# Patient Record
Sex: FEMALE | Race: WHITE | NOT HISPANIC OR LATINO | Employment: PART TIME
[De-identification: names, ages, dates, MRNs, and addresses within clinical notes are randomized per-mention and may not be internally consistent; named-entity substitution may affect disease eponyms.]

---
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
## 2024-08-12 ENCOUNTER — TRANSFERRED RECORDS (OUTPATIENT)
Dept: HEALTH INFORMATION MANAGEMENT | Facility: CLINIC | Age: 24
End: 2024-08-12

## 2024-08-13 LAB
ABO (EXTERNAL): NORMAL
GROUP B STREPTOCOCCUS (EXTERNAL): NEGATIVE
HEMOGLOBIN (EXTERNAL): 14.5 G/DL (ref 11.7–15.5)
HEPATITIS B SURFACE ANTIGEN (EXTERNAL): NONREACTIVE
HIV1+2 AB SERPL QL IA: NONREACTIVE
PLATELET COUNT (EXTERNAL): 294 10E3/UL (ref 140–400)
RH (EXTERNAL): POSITIVE
RUBELLA ANTIBODY IGG (EXTERNAL): NORMAL
TREPONEMA PALLIDUM ANTIBODY (EXTERNAL): NONREACTIVE

## 2024-09-16 ENCOUNTER — TRANSFERRED RECORDS (OUTPATIENT)
Dept: HEALTH INFORMATION MANAGEMENT | Facility: CLINIC | Age: 24
End: 2024-09-16

## 2024-09-16 ENCOUNTER — MEDICAL CORRESPONDENCE (OUTPATIENT)
Dept: HEALTH INFORMATION MANAGEMENT | Facility: CLINIC | Age: 24
End: 2024-09-16

## 2024-09-17 ENCOUNTER — TRANSFERRED RECORDS (OUTPATIENT)
Dept: HEALTH INFORMATION MANAGEMENT | Facility: CLINIC | Age: 24
End: 2024-09-17

## 2024-09-23 ENCOUNTER — TRANSCRIBE ORDERS (OUTPATIENT)
Dept: MATERNAL FETAL MEDICINE | Facility: HOSPITAL | Age: 24
End: 2024-09-23
Payer: COMMERCIAL

## 2024-09-23 DIAGNOSIS — O26.90 PREGNANCY RELATED CONDITION, ANTEPARTUM: Primary | ICD-10-CM

## 2024-10-16 ENCOUNTER — PRE VISIT (OUTPATIENT)
Dept: MATERNAL FETAL MEDICINE | Facility: HOSPITAL | Age: 24
End: 2024-10-16
Payer: COMMERCIAL

## 2024-10-21 ENCOUNTER — OFFICE VISIT (OUTPATIENT)
Dept: MATERNAL FETAL MEDICINE | Facility: HOSPITAL | Age: 24
End: 2024-10-21
Attending: OBSTETRICS & GYNECOLOGY
Payer: COMMERCIAL

## 2024-10-21 ENCOUNTER — ANCILLARY PROCEDURE (OUTPATIENT)
Dept: ULTRASOUND IMAGING | Facility: HOSPITAL | Age: 24
End: 2024-10-21
Attending: OBSTETRICS & GYNECOLOGY
Payer: COMMERCIAL

## 2024-10-21 DIAGNOSIS — O26.90 PREGNANCY RELATED CONDITION, ANTEPARTUM: ICD-10-CM

## 2024-10-21 DIAGNOSIS — O99.210 OBESITY DURING PREGNANCY: ICD-10-CM

## 2024-10-21 DIAGNOSIS — Z31.5 ENCOUNTER FOR PROCREATIVE GENETIC COUNSELING: Primary | ICD-10-CM

## 2024-10-21 DIAGNOSIS — O26.832 RENAL DISEASE DURING PREGNANCY IN SECOND TRIMESTER: ICD-10-CM

## 2024-10-21 DIAGNOSIS — O99.321 DRUG USE AFFECTING PREGNANCY IN FIRST TRIMESTER: Primary | ICD-10-CM

## 2024-10-21 DIAGNOSIS — O28.3 ABNORMAL FETAL ULTRASOUND: ICD-10-CM

## 2024-10-21 PROCEDURE — 76811 OB US DETAILED SNGL FETUS: CPT

## 2024-10-21 PROCEDURE — 76811 OB US DETAILED SNGL FETUS: CPT | Mod: 26 | Performed by: OBSTETRICS & GYNECOLOGY

## 2024-10-21 PROCEDURE — 99213 OFFICE O/P EST LOW 20 MIN: CPT | Mod: 25 | Performed by: OBSTETRICS & GYNECOLOGY

## 2024-10-21 PROCEDURE — 96040 HC GENETIC COUNSELING, EACH 30 MINUTES: CPT

## 2024-10-21 PROCEDURE — G0463 HOSPITAL OUTPT CLINIC VISIT: HCPCS | Performed by: OBSTETRICS & GYNECOLOGY

## 2024-10-21 NOTE — NURSING NOTE
Marcia Herrera is a  at 18w5d who presents to [facility] for scheduled genetic counseling and L2.  SBAR given to Dr. Vasquez, see note in Epic.

## 2024-10-22 NOTE — PROGRESS NOTES
"Please see \"Imaging\" tab under \"Chart Review\" for details of today's visit.    Bret Vasquez    "

## 2024-10-22 NOTE — PROGRESS NOTES
[facility]  Genetic Counseling Consult    Patient:  Marcia Herrera  Preferred Name: Marcia YOB: 2000   Date of Service:  10/21/24   MRN: [id number]    Marcia was seen at the [facility] [place] for genetic consultation. The indication for genetic counseling is abnormal fetal ultrasound (identifying a maternal pelvic kidney). The patient was unaccompanied to this visit.     The session was conducted in English.      IMPRESSION/ PLAN   1. Marcia has not had genetic screening in this pregnancy and declines options discussed today.    2. During today's [facility] visit, Marcia had a genetic counseling session only. Screening and diagnostic testing was discussed and declined.     3. Since the patient chose aneuploidy screening via NIPT, quad screen is NOT recommended in the second trimester. If the patient desires screening for open neural tube defects, maternal serum AFP only is recommended, ideally between 16-18 weeks gestation.    4. The patient was not certain about whether to pursue carrier screening today. They will contact us if they would like to pursue screening.     5. Marcia had a level II comprehensive anatomy ultrasound today. Please see the ultrasound report for further details.    6. Further recommendation include a follow-up ultrasound with [facility]. The upcoming ultrasound has been scheduled for 2024.    PREGNANCY HISTORY   /Parity:       Marcia's pregnancy history is significant for:   AB ()    CURRENT PREGNANCY   Current Age: 24 year old   Age at Delivery: 25 year old  KELLEY: 3/19/2025, by Last Menstrual Period                                   Gestational Age: 18w5d  This pregnancy is a single gestation.     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 Per the EMR, she also has a history of poly substance use. For a complete review of  "her diagnoses and medical history, please refer to the medical record.    Embryonic kidney development begins in the 6th-8th weeks. Pelvic kidney occurs when a kidney fails to ascend to the lumbar region and therefore remains in the pelvis. Chromosome abnormalities are rare with isolated pelvic kidney. The finding can be seen in certain syndromes and associations such as CHARGE and VACTERL. In Marcia's case, pelvic kidney appears to be isolated based on her reported history. We discussed that there could possibly be a recurrence risk (such as if there were multifactorial inheritance factors).     FAMILY HISTORY   A three-generation pedigree was obtained today and is scanned under the \"Media\" tab in Epic. The family history was reported by Marcia.    The following significant findings were reported today for Marcia's family:   Limited health history for mother and father  Mother had 3 SABs and a stillbirth  Maternal half siblings have mental health concerns  Maternal half brother has fetal alcohol syndrome and autism  2 paternal aunts had 2 SABs each    Marcia reports that the father of her pregnancy, Saurav, is 31 years of age with no major reported health concerns. The following significant findings were reported today for his family:   Father has stage 4 lung cancer, diagnosed in his 50s  Mother had breast cancer 3 times (diagnosed in 30s-40s) and has had 2 SABs    Otherwise, the reported family history is unremarkable for other multiple miscarriages, other stillbirths, infant deaths, other birth defects, intellectual disabilities, developmental delays, other autism diagnoses, known genetic conditions, other cancer under the age of 50, sudden unexplained death under age 50, and consanguinity.     Recurrent Pregnancy Loss  Recurrent miscarriage is defined as three or more clinically recognized consecutive or non-consecutive pregnancy losses occurring prior to fetal viability (<24 weeks). We discussed that there " are multiple potential causes for recurrent pregnancy loss, including genetic factors. For example, familial chromosome rearrangements can increase the risk for recurrent loss.    IUFD, Stillbirth, and Infant Death  We discussed that stillbirth may sometimes be associated with chromosomal abnormalities or other genetic conditions.    Autism  Some forms of autism spectrum disorders can be associated with specific genetic conditions, where approximately 15-20% of individuals who have autism will have an identifiable genetic cause for this history.  However, most often these conditions are due to the combination of genes and environmental factors that can affect development.  Since there is a genetic component to autism spectrum disorders, the recurrence risk for other family members is higher among first-degree relatives of the individual with an autism spectrum disorder (full siblings), and decreases with distance in relationship to other second-degree relatives.     Cancer  We discussed how most cancer seen in families occurs sporadically, but about 5-10% may be due to an underlying genetic etiology. We discussed that cancer diagnosed under the age of 50 raises suspicion for a potential hereditary cancer syndrome. Other characteristics that may suggest a hereditary cancer syndrome include cancer in 2 or more close relatives on the same side of the family, multiple primary tumors, bilateral or multiple rare cancers, constellations of tumors associated with a specific cancer syndrome, and evidence of autosomal dominant transmission.     Nacho's mother's history is concerning for a possible hereditary cancer predisposition syndrome. Saurav is encouraged to share cancer family history with his health care providers. Even if no hereditary cancer syndrome is identified in a family, individuals may be eligibile for increased screening or risk management options given a family history.     Patients may also consider meeting  "with a cancer genetic counselor.  If a family wants more information, they can contact the [facility] Cancer Risk Management Program ([phone]). Physicians can also make referrals at [email or web address] or, if within the [facility] system, through Epic referral for \"Cancer Risk Mgmt/Cancer Genetic Counseling\"     RISK ASSESSMENT FOR INHERITED CONDITIONS AND CARRIER SCREENING OPTIONS   Expanded carrier screening is available to screen for autosomal recessive conditions and X-linked conditions in a large list of genes. Carrier screening does not test the pregnancy but gives a risk assessment for the pregnancy and future pregnancies to have the condition. Expanded carrier screening is designed to identify carrier status for conditions that are primarily childhood or adolescent onset. Expanded carrier screening does not evaluate for adult-onset conditions such as hereditary cancer syndromes, dementia/Alzheimer's disease, or cardiovascular disease risk factors. Additionally, expanded carrier screening is not comprehensive for all known genetic diseases or inherited conditions. Carrier screening does not test for all genetic and health conditions or risk factors.     Autosomal recessive conditions happen when a mutation has been inherited from the egg and sperm and include conditions like cystic fibrosis, thalassemia, hearing loss, spinal muscular atrophy, and more. We reviewed that when both biological parents carry a harmful genetic change in a gene associated with autosomal recessive inheritance, each of their pregnancies has a 1 in 4 (25%) chance to be affected by that condition. X-linked conditions happen when a mutation has been inherited from the egg and include conditions like fragile X syndrome.With X-linked conditions, the specific risk generally depends on the chromosomal sex of the fetus, with XY individuals (generally male) being most severely " affected.      screening  is also performed following delivery. About [place]  Screening    The patient does NOT have a family history of known inherited conditions. This does NOT mean the patient and/or their partner is not a carrier of a condition. Approximately 90% of couples at an increased reproductive risk for an inherited condition have no family history of that condition.     The patient has not had carrier screening previously.     The patient was not certain about whether to pursue carrier screening today. They will contact us if they would like to pursue screening.     RISK ASSESSMENT FOR CHROMOSOME CONDITIONS   We explained that the risk for fetal chromosome abnormalities increases with maternal age. We discussed specific features of common chromosome abnormalities, including trisomy 21 (Down syndrome), trisomy 13, trisomy 18, and sex chromosome trisomies.    At age 25 at midtrimester, the risk to have a baby with Down syndrome is 1 in 1114.  At age 25 at midtrimester, the risk to have a baby with any chromosome abnormality is 1 in 557.     Marcia had genetic screening earlier in this pregnancy. Their non-invasive prenatal test was screen negative or low risk for screened conditions     Non-invasive prenatal testing (NIPT) results  Maternal plasma cell-free DNA testing  Screens for fetal trisomy 21, trisomy 13, trisomy 18, and sex chromosome aneuploidy  Marcia had a Qnatal test earlier in pregnancy; we reviewed the results today, which are low risk.  No Y chromosome was detected. The predicted sex is XX, which is typically female.  Qnatal panels may also include select microdeletion screening.   Given the accuracy of this test, these results greatly decrease the chance for certain fetal chromosome abnormalities  We discussed the limitations of normal NIPT results  Maternal serum AFP only to screen for open neural tube defects (after 15 weeks) was not performed in this pregnancy, to our  knowledge.     GENETIC TESTING OPTIONS   Genetic testing during a pregnancy includes screening and diagnostic procedures.      Screening tests are non-invasive which means no risk to the pregnancy and includes ultrasounds and blood work. The benefits and limitations of screening were reviewed. Screening tests provide a risk assessment (chance) specific to the pregnancy for certain fetal chromosome abnormalities but cannot definitively diagnose or exclude a fetal chromosome abnormality. Follow-up genetic counseling and consideration of diagnostic testing is recommended with any abnormal screening result. Diagnostic testing during a pregnancy is more certain and can test for more conditions. However, the tests do have a risk of miscarriage that requires careful consideration. These tests can detect fetal chromosome abnormalities with greater than 99% certainty. Results can be compromised by maternal cell contamination or mosaicism and are limited by the resolution of current genetic testing technology.     There is no screening or diagnostic test that detects all forms of birth defects or intellectual disability.     We discussed the following screening options:   Non-invasive prenatal testing (NIPT)  Please see details discussed above.     We discussed the following ultrasound options:  Comprehensive level II ultrasound (Fetal Anatomy Ultrasound)  Ultrasound done between 18-20 weeks gestation  Screens for major birth defects and markers for aneuploidy (like trisomy 21 and trisomy 18)  Includes looking at the fetus/baby's growth, heart, organs (stomach, kidneys), placenta, and amniotic fluid    We discussed the following diagnostic options:   Amniocentesis  Invasive diagnostic procedure done after 15 weeks gestation  The procedure collects a small sample of amniotic fluid for the purpose of chromosomal testing and/or other genetic testing  Diagnostic result; more than 99% sensitivity for fetal chromosome  abnormalities  Testing for AFP in the amniotic fluid can test for open neural tube defects    It was a pleasure to be involved with Marcia s care. Face-to-face time of the meeting was  25  minutes.    Mark Middleton MS, [facility]  Board Certified and [place] Licensed Genetic Counselor  [facility]  Maternal Fetal Medicine  Office: [phone]  [facility]: [phone]   Fax: [phone]  [facility]

## 2024-11-13 ENCOUNTER — OFFICE VISIT (OUTPATIENT)
Dept: MATERNAL FETAL MEDICINE | Facility: HOSPITAL | Age: 24
End: 2024-11-13
Attending: OBSTETRICS & GYNECOLOGY
Payer: COMMERCIAL

## 2024-11-13 ENCOUNTER — ANCILLARY PROCEDURE (OUTPATIENT)
Dept: ULTRASOUND IMAGING | Facility: HOSPITAL | Age: 24
End: 2024-11-13
Attending: OBSTETRICS & GYNECOLOGY
Payer: COMMERCIAL

## 2024-11-13 DIAGNOSIS — E66.09 CLASS 2 OBESITY DUE TO EXCESS CALORIES WITHOUT SERIOUS COMORBIDITY IN ADULT, UNSPECIFIED BMI: Primary | ICD-10-CM

## 2024-11-13 DIAGNOSIS — E66.812 CLASS 2 OBESITY DUE TO EXCESS CALORIES WITHOUT SERIOUS COMORBIDITY IN ADULT, UNSPECIFIED BMI: Primary | ICD-10-CM

## 2024-11-13 DIAGNOSIS — Z36.2 ENCOUNTER FOR FOLLOW-UP ULTRASOUND OF FETAL ANATOMY: ICD-10-CM

## 2024-11-13 DIAGNOSIS — O99.321 DRUG USE AFFECTING PREGNANCY IN FIRST TRIMESTER: ICD-10-CM

## 2024-11-13 PROCEDURE — 76816 OB US FOLLOW-UP PER FETUS: CPT | Mod: 26 | Performed by: OBSTETRICS & GYNECOLOGY

## 2024-11-13 PROCEDURE — 76816 OB US FOLLOW-UP PER FETUS: CPT

## 2024-11-13 PROCEDURE — 99207 PR NO CHARGE LOS: CPT | Performed by: OBSTETRICS & GYNECOLOGY

## 2024-11-13 NOTE — PROGRESS NOTES
Please refer to ultrasound report under 'Imaging' Studies of 'Chart Review' tabs.    Jason Sandy M.D.

## 2024-11-13 NOTE — NURSING NOTE
Marcia Herrera is a  at 22w0d who presents to [facility] for follow up growth ultrasound. Pt reports positive fetal movement. Pt denies bldg/lof/change in discharge, contractions, headache, vision changes, chest pain/SOB or edema. SBAR given to Dr. Sandy, see note in Epic.

## 2025-01-23 ENCOUNTER — MEDICAL CORRESPONDENCE (OUTPATIENT)
Dept: HEALTH INFORMATION MANAGEMENT | Facility: CLINIC | Age: 25
End: 2025-01-23
Payer: COMMERCIAL

## 2025-01-23 ENCOUNTER — TRANSCRIBE ORDERS (OUTPATIENT)
Dept: MATERNAL FETAL MEDICINE | Facility: HOSPITAL | Age: 25
End: 2025-01-23
Payer: COMMERCIAL

## 2025-01-23 DIAGNOSIS — O26.90 PREGNANCY RELATED CONDITION, ANTEPARTUM: Primary | ICD-10-CM

## 2025-01-24 ENCOUNTER — ANCILLARY PROCEDURE (OUTPATIENT)
Dept: ULTRASOUND IMAGING | Facility: HOSPITAL | Age: 25
End: 2025-01-24
Attending: OBSTETRICS & GYNECOLOGY
Payer: COMMERCIAL

## 2025-01-24 DIAGNOSIS — O26.90 PREGNANCY RELATED CONDITION, ANTEPARTUM: ICD-10-CM

## 2025-01-24 PROCEDURE — 76816 OB US FOLLOW-UP PER FETUS: CPT | Mod: 26 | Performed by: OBSTETRICS & GYNECOLOGY

## 2025-01-24 PROCEDURE — 76816 OB US FOLLOW-UP PER FETUS: CPT

## 2025-01-24 PROCEDURE — 76820 UMBILICAL ARTERY ECHO: CPT | Mod: 26 | Performed by: OBSTETRICS & GYNECOLOGY

## 2025-01-24 PROCEDURE — 59025 FETAL NON-STRESS TEST: CPT | Mod: 26 | Performed by: OBSTETRICS & GYNECOLOGY

## 2025-01-24 PROCEDURE — 99214 OFFICE O/P EST MOD 30 MIN: CPT | Mod: 25 | Performed by: OBSTETRICS & GYNECOLOGY

## 2025-01-24 PROCEDURE — 76820 UMBILICAL ARTERY ECHO: CPT

## 2025-01-31 ENCOUNTER — HOSPITAL ENCOUNTER (OUTPATIENT)
Dept: ULTRASOUND IMAGING | Facility: CLINIC | Age: 25
Discharge: HOME | End: 2025-01-31
Attending: STUDENT IN AN ORGANIZED HEALTH CARE EDUCATION/TRAINING PROGRAM
Payer: COMMERCIAL

## 2025-01-31 DIAGNOSIS — O36.5990 FETAL GROWTH RESTRICTION ANTEPARTUM: ICD-10-CM

## 2025-01-31 PROCEDURE — 76820 UMBILICAL ARTERY ECHO: CPT

## 2025-02-07 ENCOUNTER — HOSPITAL ENCOUNTER (OUTPATIENT)
Dept: ULTRASOUND IMAGING | Facility: CLINIC | Age: 25
Discharge: HOME | End: 2025-02-07
Attending: OBSTETRICS & GYNECOLOGY
Payer: COMMERCIAL

## 2025-02-07 DIAGNOSIS — O36.5990 FETAL GROWTH RESTRICTION ANTEPARTUM: ICD-10-CM

## 2025-02-07 PROCEDURE — 76820 UMBILICAL ARTERY ECHO: CPT

## 2025-02-17 ENCOUNTER — HOSPITAL ENCOUNTER (OUTPATIENT)
Dept: ULTRASOUND IMAGING | Facility: CLINIC | Age: 25
Discharge: HOME | End: 2025-02-17
Attending: OBSTETRICS & GYNECOLOGY
Payer: COMMERCIAL

## 2025-02-17 ENCOUNTER — OFFICE VISIT (OUTPATIENT)
Dept: MATERNAL FETAL MEDICINE | Facility: CLINIC | Age: 25
End: 2025-02-17
Attending: OBSTETRICS & GYNECOLOGY
Payer: COMMERCIAL

## 2025-02-17 DIAGNOSIS — Z36.89 ENCOUNTER FOR ULTRASOUND TO ASSESS FETAL GROWTH: Primary | ICD-10-CM

## 2025-02-17 DIAGNOSIS — O36.5990 FETAL GROWTH RESTRICTION ANTEPARTUM: ICD-10-CM

## 2025-02-17 PROCEDURE — 76816 OB US FOLLOW-UP PER FETUS: CPT

## 2025-02-17 NOTE — NURSING NOTE
Patient presents to M for RL2/UAR/NST at 35w5d due to FGR. Positive fetal movement. Denies LOF, vaginal bleeding or cramping/contractions. SBAR given to M MD, see their note in Epic.

## 2025-02-17 NOTE — PROGRESS NOTES
The patient was seen for an ultrasound in the [facility] clinic today.  For a detailed report of the ultrasound examination, please see the ultrasound report which can be found under the imaging tab.    If you have questions regarding today's evaluation or if we can be of further service, please contact the M[facility]Fetal Medicine Center.    Matheus Dominiqeu M.D.  Maternal Fetal-Medicine Specialist

## 2025-03-10 ENCOUNTER — HOSPITAL ENCOUNTER (OUTPATIENT)
Dept: ULTRASOUND IMAGING | Facility: CLINIC | Age: 25
Discharge: HOME | End: 2025-03-10
Attending: STUDENT IN AN ORGANIZED HEALTH CARE EDUCATION/TRAINING PROGRAM
Payer: COMMERCIAL

## 2025-03-10 ENCOUNTER — OFFICE VISIT (OUTPATIENT)
Dept: MATERNAL FETAL MEDICINE | Facility: CLINIC | Age: 25
End: 2025-03-10
Attending: STUDENT IN AN ORGANIZED HEALTH CARE EDUCATION/TRAINING PROGRAM
Payer: COMMERCIAL

## 2025-03-10 DIAGNOSIS — O36.5990 FETAL GROWTH RESTRICTION ANTEPARTUM: ICD-10-CM

## 2025-03-10 DIAGNOSIS — Z36.89 ENCOUNTER FOR ULTRASOUND TO ASSESS FETAL GROWTH: ICD-10-CM

## 2025-03-10 DIAGNOSIS — O99.210 OBESITY DURING PREGNANCY: Primary | ICD-10-CM

## 2025-03-10 PROCEDURE — 76819 FETAL BIOPHYS PROFIL W/O NST: CPT

## 2025-03-10 PROCEDURE — 76818 FETAL BIOPHYS PROFILE W/NST: CPT | Mod: 26 | Performed by: STUDENT IN AN ORGANIZED HEALTH CARE EDUCATION/TRAINING PROGRAM

## 2025-03-10 PROCEDURE — 76818 FETAL BIOPHYS PROFILE W/NST: CPT

## 2025-03-10 PROCEDURE — 76816 OB US FOLLOW-UP PER FETUS: CPT | Mod: 26 | Performed by: STUDENT IN AN ORGANIZED HEALTH CARE EDUCATION/TRAINING PROGRAM

## 2025-03-10 PROCEDURE — 99213 OFFICE O/P EST LOW 20 MIN: CPT | Mod: 25 | Performed by: STUDENT IN AN ORGANIZED HEALTH CARE EDUCATION/TRAINING PROGRAM

## 2025-03-10 NOTE — NURSING NOTE
Marcia presents to [facility] for follow up ultrasound and BPP due to history of FGR this pregnancy and BMI. Patient reports good fetal movement,  reports irregular contractions, denies leaking of fluid, or bleeding.  SBAR given to FRANCISCO J MD, see their note in Epic.

## 2025-03-12 ENCOUNTER — HOSPITAL ENCOUNTER (INPATIENT)
Facility: HOSPITAL | Age: 25
End: 2025-03-12
Attending: ADVANCED PRACTICE MIDWIFE
Payer: COMMERCIAL

## 2025-03-12 DIAGNOSIS — O13.3 GESTATIONAL HYPERTENSION, THIRD TRIMESTER: ICD-10-CM

## 2025-03-12 PROBLEM — O42.90 PREMATURE RUPTURE OF MEMBRANES: Status: ACTIVE | Noted: 2025-03-12

## 2025-03-12 LAB
ABO + RH BLD: NORMAL
AMPHETAMINES UR QL SCN: ABNORMAL
BARBITURATES UR QL SCN: ABNORMAL
BENZODIAZ UR QL SCN: ABNORMAL
BLD GP AB SCN SERPL QL: NEGATIVE
BZE UR QL SCN: ABNORMAL
CANNABINOIDS UR QL SCN: ABNORMAL
CREAT UR-MCNC: 118 MG/DL
ERYTHROCYTE [DISTWIDTH] IN BLOOD BY AUTOMATED COUNT: 13.2 % (ref 10–15)
FENTANYL UR QL: ABNORMAL
HCT VFR BLD AUTO: 38.6 % (ref 35–47)
HGB BLD-MCNC: 13.5 G/DL (ref 11.7–15.7)
HOLD SPECIMEN: NORMAL
MCH RBC QN AUTO: 31.2 PG (ref 26.5–33)
MCHC RBC AUTO-ENTMCNC: 35 G/DL (ref 31.5–36.5)
MCV RBC AUTO: 89 FL (ref 78–100)
OPIATES UR QL SCN: ABNORMAL
PCP QUAL URINE (ROCHE): ABNORMAL
PLATELET # BLD AUTO: 288 10E3/UL (ref 150–450)
RBC # BLD AUTO: 4.33 10E6/UL (ref 3.8–5.2)
SPECIMEN EXP DATE BLD: NORMAL
WBC # BLD AUTO: 13 10E3/UL (ref 4–11)

## 2025-03-12 PROCEDURE — 80307 DRUG TEST PRSMV CHEM ANLYZR: CPT

## 2025-03-12 PROCEDURE — 85041 AUTOMATED RBC COUNT: CPT

## 2025-03-12 PROCEDURE — 86850 RBC ANTIBODY SCREEN: CPT

## 2025-03-12 PROCEDURE — 250N000009 HC RX 250

## 2025-03-12 PROCEDURE — 258N000003 HC RX IP 258 OP 636

## 2025-03-12 PROCEDURE — 86900 BLOOD TYPING SEROLOGIC ABO: CPT

## 2025-03-12 PROCEDURE — 86780 TREPONEMA PALLIDUM: CPT

## 2025-03-12 PROCEDURE — 85018 HEMOGLOBIN: CPT

## 2025-03-12 PROCEDURE — 3E033VJ INTRODUCTION OF OTHER HORMONE INTO PERIPHERAL VEIN, PERCUTANEOUS APPROACH: ICD-10-PCS

## 2025-03-12 PROCEDURE — 36415 COLL VENOUS BLD VENIPUNCTURE: CPT

## 2025-03-12 PROCEDURE — 120N000001 HC R&B MED SURG/OB

## 2025-03-12 RX ORDER — NALOXONE HYDROCHLORIDE 0.4 MG/ML
0.4 INJECTION, SOLUTION INTRAMUSCULAR; INTRAVENOUS; SUBCUTANEOUS
Status: DISCONTINUED | OUTPATIENT
Start: 2025-03-12 | End: 2025-03-15 | Stop reason: HOSPADM

## 2025-03-12 RX ORDER — OXYTOCIN/0.9 % SODIUM CHLORIDE 30/500 ML
100-340 PLASTIC BAG, INJECTION (ML) INTRAVENOUS CONTINUOUS PRN
Status: DISCONTINUED | OUTPATIENT
Start: 2025-03-12 | End: 2025-03-15 | Stop reason: HOSPADM

## 2025-03-12 RX ORDER — CALCIUM CARBONATE 500 MG/1
1000 TABLET, CHEWABLE ORAL 3 TIMES DAILY PRN
Status: DISCONTINUED | OUTPATIENT
Start: 2025-03-12 | End: 2025-03-15 | Stop reason: HOSPADM

## 2025-03-12 RX ORDER — OXYTOCIN 10 [USP'U]/ML
10 INJECTION, SOLUTION INTRAMUSCULAR; INTRAVENOUS
Status: DISCONTINUED | OUTPATIENT
Start: 2025-03-12 | End: 2025-03-13 | Stop reason: HOSPADM

## 2025-03-12 RX ORDER — METHYLERGONOVINE MALEATE 0.2 MG/ML
200 INJECTION INTRAVENOUS
Status: DISCONTINUED | OUTPATIENT
Start: 2025-03-12 | End: 2025-03-13 | Stop reason: HOSPADM

## 2025-03-12 RX ORDER — TERBUTALINE SULFATE 1 MG/ML
0.25 INJECTION SUBCUTANEOUS
Status: DISCONTINUED | OUTPATIENT
Start: 2025-03-12 | End: 2025-03-13 | Stop reason: HOSPADM

## 2025-03-12 RX ORDER — LOPERAMIDE HYDROCHLORIDE 2 MG/1
4 CAPSULE ORAL
Status: DISCONTINUED | OUTPATIENT
Start: 2025-03-12 | End: 2025-03-13 | Stop reason: HOSPADM

## 2025-03-12 RX ORDER — METOCLOPRAMIDE HYDROCHLORIDE 5 MG/ML
10 INJECTION INTRAMUSCULAR; INTRAVENOUS EVERY 6 HOURS PRN
Status: DISCONTINUED | OUTPATIENT
Start: 2025-03-12 | End: 2025-03-13 | Stop reason: HOSPADM

## 2025-03-12 RX ORDER — LIDOCAINE 40 MG/G
CREAM TOPICAL
Status: DISCONTINUED | OUTPATIENT
Start: 2025-03-12 | End: 2025-03-13 | Stop reason: HOSPADM

## 2025-03-12 RX ORDER — OXYTOCIN 10 [USP'U]/ML
10 INJECTION, SOLUTION INTRAMUSCULAR; INTRAVENOUS
Status: DISCONTINUED | OUTPATIENT
Start: 2025-03-12 | End: 2025-03-15 | Stop reason: HOSPADM

## 2025-03-12 RX ORDER — TRANEXAMIC ACID 10 MG/ML
1 INJECTION, SOLUTION INTRAVENOUS EVERY 30 MIN PRN
Status: DISCONTINUED | OUTPATIENT
Start: 2025-03-12 | End: 2025-03-13 | Stop reason: HOSPADM

## 2025-03-12 RX ORDER — KETOROLAC TROMETHAMINE 15 MG/ML
15 INJECTION, SOLUTION INTRAMUSCULAR; INTRAVENOUS
Status: DISCONTINUED | OUTPATIENT
Start: 2025-03-12 | End: 2025-03-13 | Stop reason: HOSPADM

## 2025-03-12 RX ORDER — ONDANSETRON 4 MG/1
4 TABLET, ORALLY DISINTEGRATING ORAL EVERY 6 HOURS PRN
Status: DISCONTINUED | OUTPATIENT
Start: 2025-03-12 | End: 2025-03-13 | Stop reason: HOSPADM

## 2025-03-12 RX ORDER — ONDANSETRON 2 MG/ML
4 INJECTION INTRAMUSCULAR; INTRAVENOUS EVERY 6 HOURS PRN
Status: DISCONTINUED | OUTPATIENT
Start: 2025-03-12 | End: 2025-03-13 | Stop reason: HOSPADM

## 2025-03-12 RX ORDER — MISOPROSTOL 200 UG/1
400 TABLET ORAL
Status: DISCONTINUED | OUTPATIENT
Start: 2025-03-12 | End: 2025-03-13 | Stop reason: HOSPADM

## 2025-03-12 RX ORDER — PROCHLORPERAZINE MALEATE 10 MG
10 TABLET ORAL EVERY 6 HOURS PRN
Status: DISCONTINUED | OUTPATIENT
Start: 2025-03-12 | End: 2025-03-13 | Stop reason: HOSPADM

## 2025-03-12 RX ORDER — CITRIC ACID/SODIUM CITRATE 334-500MG
30 SOLUTION, ORAL ORAL
Status: DISCONTINUED | OUTPATIENT
Start: 2025-03-12 | End: 2025-03-13 | Stop reason: HOSPADM

## 2025-03-12 RX ORDER — SODIUM CHLORIDE, SODIUM LACTATE, POTASSIUM CHLORIDE, CALCIUM CHLORIDE 600; 310; 30; 20 MG/100ML; MG/100ML; MG/100ML; MG/100ML
INJECTION, SOLUTION INTRAVENOUS CONTINUOUS PRN
Status: DISCONTINUED | OUTPATIENT
Start: 2025-03-12 | End: 2025-03-13 | Stop reason: HOSPADM

## 2025-03-12 RX ORDER — NALOXONE HYDROCHLORIDE 0.4 MG/ML
0.2 INJECTION, SOLUTION INTRAMUSCULAR; INTRAVENOUS; SUBCUTANEOUS
Status: DISCONTINUED | OUTPATIENT
Start: 2025-03-12 | End: 2025-03-15 | Stop reason: HOSPADM

## 2025-03-12 RX ORDER — FENTANYL CITRATE 50 UG/ML
100 INJECTION, SOLUTION INTRAMUSCULAR; INTRAVENOUS
Status: DISCONTINUED | OUTPATIENT
Start: 2025-03-12 | End: 2025-03-13 | Stop reason: HOSPADM

## 2025-03-12 RX ORDER — OXYTOCIN/0.9 % SODIUM CHLORIDE 30/500 ML
1-24 PLASTIC BAG, INJECTION (ML) INTRAVENOUS CONTINUOUS
Status: DISCONTINUED | OUTPATIENT
Start: 2025-03-12 | End: 2025-03-13 | Stop reason: HOSPADM

## 2025-03-12 RX ORDER — LOPERAMIDE HYDROCHLORIDE 2 MG/1
2 CAPSULE ORAL
Status: DISCONTINUED | OUTPATIENT
Start: 2025-03-12 | End: 2025-03-13 | Stop reason: HOSPADM

## 2025-03-12 RX ORDER — MISOPROSTOL 200 UG/1
800 TABLET ORAL
Status: DISCONTINUED | OUTPATIENT
Start: 2025-03-12 | End: 2025-03-13 | Stop reason: HOSPADM

## 2025-03-12 RX ORDER — CARBOPROST TROMETHAMINE 250 UG/ML
250 INJECTION, SOLUTION INTRAMUSCULAR
Status: DISCONTINUED | OUTPATIENT
Start: 2025-03-12 | End: 2025-03-13 | Stop reason: HOSPADM

## 2025-03-12 RX ORDER — IBUPROFEN 800 MG/1
800 TABLET, FILM COATED ORAL
Status: DISCONTINUED | OUTPATIENT
Start: 2025-03-12 | End: 2025-03-13 | Stop reason: HOSPADM

## 2025-03-12 RX ORDER — METOCLOPRAMIDE 10 MG/1
10 TABLET ORAL EVERY 6 HOURS PRN
Status: DISCONTINUED | OUTPATIENT
Start: 2025-03-12 | End: 2025-03-13 | Stop reason: HOSPADM

## 2025-03-12 RX ORDER — ACETAMINOPHEN 325 MG/1
650 TABLET ORAL EVERY 4 HOURS PRN
Status: DISCONTINUED | OUTPATIENT
Start: 2025-03-12 | End: 2025-03-13 | Stop reason: HOSPADM

## 2025-03-12 RX ORDER — OXYTOCIN/0.9 % SODIUM CHLORIDE 30/500 ML
340 PLASTIC BAG, INJECTION (ML) INTRAVENOUS CONTINUOUS PRN
Status: DISCONTINUED | OUTPATIENT
Start: 2025-03-12 | End: 2025-03-13 | Stop reason: HOSPADM

## 2025-03-12 RX ADMIN — Medication 2 MILLI-UNITS/MIN: at 20:22

## 2025-03-12 RX ADMIN — SODIUM CHLORIDE, SODIUM LACTATE, POTASSIUM CHLORIDE, AND CALCIUM CHLORIDE: .6; .31; .03; .02 INJECTION, SOLUTION INTRAVENOUS at 20:15

## 2025-03-12 ASSESSMENT — ACTIVITIES OF DAILY LIVING (ADL)
ADLS_ACUITY_SCORE: 15

## 2025-03-13 LAB
ALBUMIN MFR UR ELPH: 7.1 MG/DL
ALBUMIN SERPL BCG-MCNC: 3.7 G/DL (ref 3.5–5.2)
ALP SERPL-CCNC: 184 U/L (ref 40–150)
ALT SERPL W P-5'-P-CCNC: 19 U/L (ref 0–50)
ANION GAP SERPL CALCULATED.3IONS-SCNC: 12 MMOL/L (ref 7–15)
AST SERPL W P-5'-P-CCNC: 19 U/L (ref 0–45)
BILIRUB SERPL-MCNC: 0.4 MG/DL
BUN SERPL-MCNC: 9.2 MG/DL (ref 6–20)
CALCIUM SERPL-MCNC: 9.9 MG/DL (ref 8.8–10.4)
CHLORIDE SERPL-SCNC: 103 MMOL/L (ref 98–107)
CREAT SERPL-MCNC: 0.61 MG/DL (ref 0.51–0.95)
CREAT UR-MCNC: 56.7 MG/DL
EGFRCR SERPLBLD CKD-EPI 2021: >90 ML/MIN/1.73M2
ERYTHROCYTE [DISTWIDTH] IN BLOOD BY AUTOMATED COUNT: 13.3 % (ref 10–15)
GLUCOSE SERPL-MCNC: 74 MG/DL (ref 70–99)
HCO3 SERPL-SCNC: 20 MMOL/L (ref 22–29)
HCT VFR BLD AUTO: 39 % (ref 35–47)
HGB BLD-MCNC: 13.6 G/DL (ref 11.7–15.7)
MCH RBC QN AUTO: 31.3 PG (ref 26.5–33)
MCHC RBC AUTO-ENTMCNC: 34.9 G/DL (ref 31.5–36.5)
MCV RBC AUTO: 90 FL (ref 78–100)
PLATELET # BLD AUTO: 300 10E3/UL (ref 150–450)
POTASSIUM SERPL-SCNC: 4.2 MMOL/L (ref 3.4–5.3)
PROT SERPL-MCNC: 6.9 G/DL (ref 6.4–8.3)
PROT/CREAT 24H UR: 0.13 MG/MG CR (ref 0–0.2)
RBC # BLD AUTO: 4.35 10E6/UL (ref 3.8–5.2)
SODIUM SERPL-SCNC: 135 MMOL/L (ref 135–145)
T PALLIDUM AB SER QL: NONREACTIVE
WBC # BLD AUTO: 11.2 10E3/UL (ref 4–11)

## 2025-03-13 PROCEDURE — 250N000013 HC RX MED GY IP 250 OP 250 PS 637

## 2025-03-13 PROCEDURE — 722N000001 HC LABOR CARE VAGINAL DELIVERY SINGLE

## 2025-03-13 PROCEDURE — 250N000011 HC RX IP 250 OP 636: Performed by: ANESTHESIOLOGY

## 2025-03-13 PROCEDURE — 00HU33Z INSERTION OF INFUSION DEVICE INTO SPINAL CANAL, PERCUTANEOUS APPROACH: ICD-10-PCS | Performed by: ANESTHESIOLOGY

## 2025-03-13 PROCEDURE — 250N000011 HC RX IP 250 OP 636

## 2025-03-13 PROCEDURE — 84075 ASSAY ALKALINE PHOSPHATASE: CPT | Performed by: ADVANCED PRACTICE MIDWIFE

## 2025-03-13 PROCEDURE — 84156 ASSAY OF PROTEIN URINE: CPT | Performed by: ADVANCED PRACTICE MIDWIFE

## 2025-03-13 PROCEDURE — 3E0R3BZ INTRODUCTION OF ANESTHETIC AGENT INTO SPINAL CANAL, PERCUTANEOUS APPROACH: ICD-10-PCS | Performed by: ANESTHESIOLOGY

## 2025-03-13 PROCEDURE — 258N000003 HC RX IP 258 OP 636

## 2025-03-13 PROCEDURE — 84155 ASSAY OF PROTEIN SERUM: CPT | Performed by: ADVANCED PRACTICE MIDWIFE

## 2025-03-13 PROCEDURE — 85048 AUTOMATED LEUKOCYTE COUNT: CPT | Performed by: ADVANCED PRACTICE MIDWIFE

## 2025-03-13 PROCEDURE — 120N000001 HC R&B MED SURG/OB

## 2025-03-13 PROCEDURE — 85018 HEMOGLOBIN: CPT | Performed by: ADVANCED PRACTICE MIDWIFE

## 2025-03-13 PROCEDURE — 36415 COLL VENOUS BLD VENIPUNCTURE: CPT | Performed by: ADVANCED PRACTICE MIDWIFE

## 2025-03-13 RX ORDER — POLYETHYLENE GLYCOL 3350 17 G/17G
17 POWDER, FOR SOLUTION ORAL DAILY PRN
Status: DISCONTINUED | OUTPATIENT
Start: 2025-03-13 | End: 2025-03-15 | Stop reason: HOSPADM

## 2025-03-13 RX ORDER — FENTANYL CITRATE-0.9 % NACL/PF 10 MCG/ML
100 PLASTIC BAG, INJECTION (ML) INTRAVENOUS EVERY 5 MIN PRN
Status: DISCONTINUED | OUTPATIENT
Start: 2025-03-13 | End: 2025-03-13 | Stop reason: HOSPADM

## 2025-03-13 RX ORDER — HYDROXYZINE HYDROCHLORIDE 50 MG/1
50 TABLET, FILM COATED ORAL EVERY 6 HOURS PRN
Status: DISCONTINUED | OUTPATIENT
Start: 2025-03-13 | End: 2025-03-15 | Stop reason: HOSPADM

## 2025-03-13 RX ORDER — HYDROCORTISONE 25 MG/G
CREAM TOPICAL 3 TIMES DAILY PRN
Status: DISCONTINUED | OUTPATIENT
Start: 2025-03-13 | End: 2025-03-15 | Stop reason: HOSPADM

## 2025-03-13 RX ORDER — OXYCODONE HYDROCHLORIDE 5 MG/1
5 TABLET ORAL EVERY 4 HOURS PRN
Status: DISCONTINUED | OUTPATIENT
Start: 2025-03-13 | End: 2025-03-15 | Stop reason: HOSPADM

## 2025-03-13 RX ORDER — ACETAMINOPHEN 325 MG/1
650 TABLET ORAL EVERY 4 HOURS PRN
Status: DISCONTINUED | OUTPATIENT
Start: 2025-03-13 | End: 2025-03-15 | Stop reason: HOSPADM

## 2025-03-13 RX ORDER — LABETALOL HYDROCHLORIDE 5 MG/ML
20-80 INJECTION, SOLUTION INTRAVENOUS EVERY 10 MIN PRN
Status: DISCONTINUED | OUTPATIENT
Start: 2025-03-13 | End: 2025-03-15 | Stop reason: HOSPADM

## 2025-03-13 RX ORDER — FENTANYL/ROPIVACAINE/NS/PF 2MCG/ML-.1
PLASTIC BAG, INJECTION (ML) EPIDURAL
Status: DISCONTINUED | OUTPATIENT
Start: 2025-03-13 | End: 2025-03-13 | Stop reason: HOSPADM

## 2025-03-13 RX ORDER — METHYLERGONOVINE MALEATE 0.2 MG/ML
200 INJECTION INTRAVENOUS
Status: DISCONTINUED | OUTPATIENT
Start: 2025-03-13 | End: 2025-03-15 | Stop reason: HOSPADM

## 2025-03-13 RX ORDER — IBUPROFEN 800 MG/1
800 TABLET, FILM COATED ORAL EVERY 6 HOURS PRN
Status: DISCONTINUED | OUTPATIENT
Start: 2025-03-13 | End: 2025-03-15 | Stop reason: HOSPADM

## 2025-03-13 RX ORDER — CARBOPROST TROMETHAMINE 250 UG/ML
250 INJECTION, SOLUTION INTRAMUSCULAR
Status: DISCONTINUED | OUTPATIENT
Start: 2025-03-13 | End: 2025-03-15 | Stop reason: HOSPADM

## 2025-03-13 RX ORDER — ASPIRIN 81 MG/1
81 TABLET ORAL DAILY
Status: ON HOLD | COMMUNITY
End: 2025-03-15

## 2025-03-13 RX ORDER — LOPERAMIDE HYDROCHLORIDE 2 MG/1
2 CAPSULE ORAL
Status: DISCONTINUED | OUTPATIENT
Start: 2025-03-13 | End: 2025-03-15 | Stop reason: HOSPADM

## 2025-03-13 RX ORDER — BISACODYL 10 MG
10 SUPPOSITORY, RECTAL RECTAL DAILY PRN
Status: DISCONTINUED | OUTPATIENT
Start: 2025-03-13 | End: 2025-03-15 | Stop reason: HOSPADM

## 2025-03-13 RX ORDER — HYDRALAZINE HYDROCHLORIDE 20 MG/ML
10 INJECTION INTRAMUSCULAR; INTRAVENOUS
Status: DISCONTINUED | OUTPATIENT
Start: 2025-03-13 | End: 2025-03-15 | Stop reason: HOSPADM

## 2025-03-13 RX ORDER — LOPERAMIDE HYDROCHLORIDE 2 MG/1
4 CAPSULE ORAL
Status: DISCONTINUED | OUTPATIENT
Start: 2025-03-13 | End: 2025-03-15 | Stop reason: HOSPADM

## 2025-03-13 RX ORDER — TRANEXAMIC ACID 10 MG/ML
1 INJECTION, SOLUTION INTRAVENOUS EVERY 30 MIN PRN
Status: DISCONTINUED | OUTPATIENT
Start: 2025-03-13 | End: 2025-03-15 | Stop reason: HOSPADM

## 2025-03-13 RX ORDER — MISOPROSTOL 200 UG/1
400 TABLET ORAL
Status: DISCONTINUED | OUTPATIENT
Start: 2025-03-13 | End: 2025-03-15 | Stop reason: HOSPADM

## 2025-03-13 RX ORDER — NICOTINE 21 MG/24HR
1 PATCH, TRANSDERMAL 24 HOURS TRANSDERMAL DAILY PRN
Status: DISCONTINUED | OUTPATIENT
Start: 2025-03-13 | End: 2025-03-15 | Stop reason: HOSPADM

## 2025-03-13 RX ORDER — OXYTOCIN 10 [USP'U]/ML
10 INJECTION, SOLUTION INTRAMUSCULAR; INTRAVENOUS
Status: DISCONTINUED | OUTPATIENT
Start: 2025-03-13 | End: 2025-03-15 | Stop reason: HOSPADM

## 2025-03-13 RX ORDER — LIDOCAINE 40 MG/G
CREAM TOPICAL
Status: DISCONTINUED | OUTPATIENT
Start: 2025-03-13 | End: 2025-03-15 | Stop reason: HOSPADM

## 2025-03-13 RX ORDER — MORPHINE SULFATE 10 MG/ML
10 INJECTION, SOLUTION INTRAMUSCULAR; INTRAVENOUS ONCE
Status: COMPLETED | OUTPATIENT
Start: 2025-03-13 | End: 2025-03-13

## 2025-03-13 RX ORDER — OXYTOCIN/0.9 % SODIUM CHLORIDE 30/500 ML
340 PLASTIC BAG, INJECTION (ML) INTRAVENOUS CONTINUOUS PRN
Status: DISCONTINUED | OUTPATIENT
Start: 2025-03-13 | End: 2025-03-15 | Stop reason: HOSPADM

## 2025-03-13 RX ORDER — SODIUM CHLORIDE, SODIUM LACTATE, POTASSIUM CHLORIDE, CALCIUM CHLORIDE 600; 310; 30; 20 MG/100ML; MG/100ML; MG/100ML; MG/100ML
10-125 INJECTION, SOLUTION INTRAVENOUS CONTINUOUS
Status: DISCONTINUED | OUTPATIENT
Start: 2025-03-13 | End: 2025-03-15 | Stop reason: HOSPADM

## 2025-03-13 RX ORDER — AMOXICILLIN 250 MG
2 CAPSULE ORAL
Status: DISCONTINUED | OUTPATIENT
Start: 2025-03-13 | End: 2025-03-15 | Stop reason: HOSPADM

## 2025-03-13 RX ORDER — NALBUPHINE HYDROCHLORIDE 20 MG/ML
2.5-5 INJECTION, SOLUTION INTRAMUSCULAR; INTRAVENOUS; SUBCUTANEOUS EVERY 6 HOURS PRN
Status: DISCONTINUED | OUTPATIENT
Start: 2025-03-13 | End: 2025-03-15 | Stop reason: HOSPADM

## 2025-03-13 RX ORDER — MISOPROSTOL 200 UG/1
800 TABLET ORAL
Status: DISCONTINUED | OUTPATIENT
Start: 2025-03-13 | End: 2025-03-15 | Stop reason: HOSPADM

## 2025-03-13 RX ADMIN — BENZOCAINE AND LEVOMENTHOL: 200; 5 SPRAY TOPICAL at 21:38

## 2025-03-13 RX ADMIN — IBUPROFEN 800 MG: 800 TABLET ORAL at 21:37

## 2025-03-13 RX ADMIN — SODIUM CHLORIDE, SODIUM LACTATE, POTASSIUM CHLORIDE, AND CALCIUM CHLORIDE: .6; .31; .03; .02 INJECTION, SOLUTION INTRAVENOUS at 17:18

## 2025-03-13 RX ADMIN — Medication: at 16:43

## 2025-03-13 RX ADMIN — MORPHINE SULFATE 10 MG: 10 INJECTION, SOLUTION INTRAMUSCULAR; INTRAVENOUS at 02:05

## 2025-03-13 RX ADMIN — SODIUM CHLORIDE, SODIUM LACTATE, POTASSIUM CHLORIDE, AND CALCIUM CHLORIDE 500 ML: .6; .31; .03; .02 INJECTION, SOLUTION INTRAVENOUS at 16:27

## 2025-03-13 RX ADMIN — FENTANYL CITRATE 100 MCG: 50 INJECTION, SOLUTION INTRAMUSCULAR; INTRAVENOUS at 14:42

## 2025-03-13 RX ADMIN — CALCIUM CARBONATE (ANTACID) CHEW TAB 500 MG 1000 MG: 500 CHEW TAB at 06:09

## 2025-03-13 RX ADMIN — HYDROXYZINE HYDROCHLORIDE 50 MG: 50 TABLET, FILM COATED ORAL at 02:04

## 2025-03-13 ASSESSMENT — ACTIVITIES OF DAILY LIVING (ADL)
ADLS_ACUITY_SCORE: 15
ADLS_ACUITY_SCORE: 28
ADLS_ACUITY_SCORE: 15
ADLS_ACUITY_SCORE: 15
ADLS_ACUITY_SCORE: 20
ADLS_ACUITY_SCORE: 15

## 2025-03-13 NOTE — H&P
HISTORY AND PHYSICAL UPDATE ADMISSION EXAM    Name: Marcia Herrera  YOB: 2000  Medical Record Number: [id number]    History of Present Illness: Marcia Herrera is a 25 year old  who is 39w0d pregnant and being admitted for induction of labor, indication prelabor ROM. She was seen in clinic for a routine OB appt and was found to be grossly ruptured- believes this occurred around 11:00 this morning. Cervix was 4/80/-1 at appt. She presented to [facility] at 1810 for IOL. Marcia received early and consistent prenatal care. She admits to a history of meth, cocaine, marijuana, and tobacco use- she stopped using meth and cocaine with positive UPT, continued to vape and use marijuana but tried to cut back as much as possible. She was followed by MFM for FGR diagnosed at 32 weeks which resolved at 35 wks. She is supported by her partner Saurav- they are expecting a girl! Marcia plans to use nitrous and an epidural for pain management.     Last growth US on 3/10 at 38w5d: EFW 19% (2982g/6#9oz), AC 12%, MVP 3.2    Blood Type: O pos  GBS: Negative  GCT: 138  NIPS: low risk, XX    Estimated Date of Delivery: Mar 19, 2025  EGA 39w0d    OB History    Para Term  AB Living   2 0 0 0 1 0   SAB IAB Ectopic Multiple Live Births   0 1 0 0 0      # Outcome Date GA Lbr Vel/2nd Weight Sex Type Anes PTL Lv   2 Current            1 IAB 2024                Lab Results   Component Value Date    HGB 13.5 2025       Prenatal Complications:   Tobacco use (vaped throughout pregnancy)  History of drug use: meth, cocaine, and marijuana (stopped meth and cocaine with +UPT, continued marijuana use)  Depression (diagnosed age 18)  Thoughts of self-harm at 20 wks (improved after moving in with family, declined starting medication, monitored closely for rest of pregnancy- no further concerns. 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)  Maternal right pelvic kidney found incidentally on U/S  FGR at 32 weeks (EFW 11%, AC 2.8%), resolved at 35 weeks     Exam:      /70 (BP Location: Left arm, Patient Position: Semi-Munroe's, Cuff Size: Adult Regular)   Temp 98.1  F (36.7  C) (Oral)   Resp 18   LMP 2024     Fetal Heart Rate: Reactive NST  Contractions:  rare ctx    HEENT grossly normal  Lungs: respirations regular and unlabored  ABD gravid, non-tender  EXT:  trace edema, moves freely  GYN: consent obtained for thorough exam of external genitalia and internal exam with clear plastic speculum with light- no suspicious lesions noted.   Vaginal exam /-1/cephalic in clinic today  Membranes: SROM    Assessment:   24 yo  at 39w0d  Induction of labor, indication pre-labor ROM  SROM x 9 hrs  Reactive NST  O positive  GBS negative      Plan:   - Admit - see IP orders  - Marcia confirms she did not start HSV prophylaxis at 36 weeks- denies symptoms, no suspicious lesions noted on thorough exam.   - Labor induction with Pitocin  - Pain medication as desired- plans to try nitrous, ultimately wants an epidural  - Nicotine patch ordered PRN  - Utox collected with maternal consent   - Anticipate   - AMTSL with delayed cord clamping as allowed by maternal and  status      Prenatal record reviewed.  Dr. Quintero remains available for consultation and collaboration as needed.    HUMERA Gerardo CNM    3/12/2025   8:17 PM

## 2025-03-13 NOTE — PROVIDER NOTIFICATION
Zion WEEKS updated on BP. Patient contractions 2 minutes apart palpating moderate. Patient using nitrous at the moment, planning to do IV fentanyl soon.  Order to half pitocin. CNM to put in HTN labs   03/13/25 1430   Vital Signs   Oximeter Heart Rate 83 bpm   BP (!) 150/102   BP - Mean 121

## 2025-03-13 NOTE — PROVIDER NOTIFICATION
03/13/25 0600   Provider Notification   Provider Name/Title MICKY Hodge   Method of Notification Phone   Request Evaluate - Remote   Notification Reason Decels;Variability Change;Membrane Status;Labor Status;Uterine Activity;Pain;Status Update;ROMEE     MICKY Hodge notified patient currently at max on pitocin since 0403. Patient has had hydroxyzine and IM morphine and was able to sleep for a couple of hours. After morphine patient reports not feeling much contractions at all. Been category 1 with moderate and accels. Had two decels during cervical check due to patient on back. Cervical check remains unchanged.     New orders from [facility] to stop pitocin now and give tums and restart pitocin around 2164-3748.

## 2025-03-13 NOTE — PROGRESS NOTES
Cal dunlap updated on patient. Patient sleeping at this time. CNM would like pitocin started. Pitocin restarted at 2 ml/hr

## 2025-03-13 NOTE — PLAN OF CARE
"  Problem: Adult Inpatient Plan of Care  Goal: Plan of Care Review  Description: The Plan of Care Review/Shift note should be completed every shift.  The Outcome Evaluation is a brief statement about your assessment that the patient is improving, declining, or no change.  This information will be displayed automatically on your shift  note.  Outcome: Progressing  Flowsheets (Taken 3/13/2025 1531)  Outcome Evaluation: Forebag ruptured at 1413. SVE 5/60/-1, unchanged. Patient planning epidural. PIH labs ordered for higher blood pressures.  Plan of Care Reviewed With: patient  Overall Patient Progress: improving  Goal: Patient-Specific Goal (Individualized)  Description: You can add care plan individualizations to a care plan. Examples of Individualization might be:  \"Parent requests to be called daily at 9am for status\", \"I have a hard time hearing out of my right ear\", or \"Do not touch me to wake me up as it startles  me\".  Outcome: Progressing  Goal: Absence of Hospital-Acquired Illness or Injury  Outcome: Progressing  Intervention: Prevent Skin Injury  Recent Flowsheet Documentation  Taken 3/13/2025 1010 by Serena Espinoza RN  Body Position: position changed independently  Intervention: Prevent Infection  Recent Flowsheet Documentation  Taken 3/13/2025 1010 by Serena Espinoza RN  Infection Prevention:   rest/sleep promoted   hand hygiene promoted  Goal: Optimal Comfort and Wellbeing  Outcome: Progressing  Intervention: Monitor Pain and Promote Comfort  Recent Flowsheet Documentation  Taken 3/13/2025 1442 by Serena Espinoza RN  Pain Management Interventions: medication (see MAR)  Intervention: Provide Person-Centered Care  Recent Flowsheet Documentation  Taken 3/13/2025 1010 by Serena Espinoza RN  Trust Relationship/Rapport:   care explained   choices provided   questions answered   questions encouraged   emotional support provided   empathic listening provided  Goal: Readiness for Transition of Care  Outcome: " Progressing     Problem: Labor  Goal: Hemostasis  Outcome: Progressing  Goal: Stable Fetal Wellbeing  Outcome: Progressing  Intervention: Promote and Monitor Fetal Wellbeing  Recent Flowsheet Documentation  Taken 3/13/2025 1010 by Serena Espinoza RN  Body Position: position changed independently  Goal: Effective Progression to Delivery  Outcome: Progressing  Goal: Absence of Infection Signs and Symptoms  Outcome: Progressing  Intervention: Prevent or Manage Infection  Recent Flowsheet Documentation  Taken 3/13/2025 1010 by Serena Espinoza RN  Infection Prevention:   rest/sleep promoted   hand hygiene promoted  Goal: Acceptable Pain Control  Outcome: Progressing  Goal: Normal Uterine Contraction Pattern  Outcome: Progressing   Goal Outcome Evaluation:      Plan of Care Reviewed With: patient    Overall Patient Progress: improvingOverall Patient Progress: improving    Outcome Evaluation: Forebag ruptured at 1413. SVE 5/60/-1, unchanged. Patient planning epidural. PIH labs ordered for higher blood pressures.

## 2025-03-13 NOTE — PROGRESS NOTES
Data: [facility]: 3/12/2025  6:09 PM.  Patient admitted for induction for ROM at 1130 and SVE in clinic . Patient is a .  Prenatal record reviewed. Pregnancy  has been complicated by uncomplicated.   Gestational Age 39w0d. VSS. Fetal movement present. Patient denies uterine contractions, vaginal bleeding, abdominal pain, pelvic pressure. Support person is present.   Action: Verbal consent for EFM.  Admission assessment completed. Bill of rights reviewed.  Response: Patient verbalized agreement with plan. Will contact [facility] with update and further orders.   Patient has history of drug use. Patient consent to UA drug screen.

## 2025-03-13 NOTE — PROGRESS NOTES
Patient reports not being able to sleep due to crampy feeling of contractions. Patient requesting something for sleep and to help with pain.     MICKY Hodge notified of above. Telephone orders for 50 mg atarax and 10 mg IM morphine x once.

## 2025-03-13 NOTE — PLAN OF CARE
Problem: Labor  Goal: Stable Fetal Wellbeing  3/13/2025 0615 by Melani Morgan RN  Outcome: Progressing  3/12/2025 2142 by Melani Morgan RN  Outcome: Progressing  Intervention: Promote and Monitor Fetal Wellbeing  Recent Flowsheet Documentation  Taken 3/12/2025 2320 by Melani Morgan RN  Body Position: position changed independently  Taken 3/12/2025 1930 by Melani Morgan RN  Body Position: position changed independently     Problem: Labor  Goal: Effective Progression to Delivery  3/13/2025 0615 by Melani Morgan RN  Outcome: Progressing  3/12/2025 2142 by Melani Morgan RN  Outcome: Progressing     Problem: Labor  Goal: Acceptable Pain Control  3/13/2025 0615 by Melani Morgan RN  Outcome: Progressing  3/12/2025 2142 by Melani Morgan RN  Outcome: Progressing     Problem: Labor  Goal: Normal Uterine Contraction Pattern  3/13/2025 0615 by Melani Morgan RN  Outcome: Progressing  3/12/2025 2142 by Melani Morgan RN  Outcome: Progressing  Intervention: Monitor and Manage Uterine Activity  Recent Flowsheet Documentation  Taken 3/12/2025 2320 by Melani Morgan RN  Fluid/Electrolyte Management: fluids provided  Taken 3/12/2025 1930 by Melani Morgan RN  Fluid/Electrolyte Management: fluids provided   Goal Outcome Evaluation:      Plan of Care Reviewed With: patient    Overall Patient Progress: improvingOverall Patient Progress: improving    Outcome Evaluation: Patient VSS. Denies of any pre-e symptoms. Ambulating in room independently. Coping well with contractions with IM morphine and hydroxyzine. Pitocin at max 24 mu/min. Orders to stop pitocin and restart in about an hour. See other note. Patient encouraged to change positions frequently. Patient states that she would like sleep some more for now. Continue to monitor.

## 2025-03-13 NOTE — PROVIDER NOTIFICATION
Midwife Elvis updated on BP. Patient walking until 1105 and currently on ball. Will recheck when sitting in bed   03/13/25 1048   Vital Signs   Resp 18   Oximeter Heart Rate 88 bpm   BP (!) 144/89   BP - Mean 109   Pain/Comfort/Sleep   Patient Currently in Pain yes   Preferred Pain Scale Coping with Labor Algorhithm   Response to Labor/Coping Able to relax between contractions;States is coping   Comfort Measures Breathing/relaxation

## 2025-03-13 NOTE — PROGRESS NOTES
At 1835: provider updated that patient feels pushy with contractions. Cal Donovan CNM coming to bedside.

## 2025-03-13 NOTE — PROGRESS NOTES
1905: SHANTELLE Hodge updated on patient admission. Patient states her water broke at 1130, clear. Order for IV pitocin. No order for Rom+ at this time. Naveen to come and see patient.

## 2025-03-13 NOTE — PLAN OF CARE
Problem: Labor  Goal: Stable Fetal Wellbeing  Outcome: Progressing  Intervention: Promote and Monitor Fetal Wellbeing  Recent Flowsheet Documentation  Taken 3/12/2025 1930 by Melani Morgan, RN  Body Position: position changed independently     Problem: Labor  Goal: Effective Progression to Delivery  Outcome: Progressing     Problem: Labor  Goal: Acceptable Pain Control  Outcome: Progressing     Problem: Labor  Goal: Normal Uterine Contraction Pattern  Outcome: Progressing  Intervention: Monitor and Manage Uterine Activity  Recent Flowsheet Documentation  Taken 3/12/2025 1930 by Melani Morgan, RN  Fluid/Electrolyte Management: fluids provided    Goal Outcome Evaluation:      Plan of Care Reviewed With: patient, significant other    Overall Patient Progress: improvingOverall Patient Progress: improving    Outcome Evaluation: Patient is able to make needs known. VSS. Denies of any headaches, vision changes, or pain in upper right abdomen. Ambulating in room indepdently. MICKY Hodge performed vaginal exam, currently no outbreaks of HSV. Tolerating contractions well. Will let RN know when needing nitrous oxide for labor pain. Patient started on Pitocin. Plan is to continue to increase per protocol. Will continue to monitor.    Category 1 fetal heart tracing with moderate variability and accels. Now kristen every 2-3.5 minutes.

## 2025-03-14 VITALS
SYSTOLIC BLOOD PRESSURE: 122 MMHG | HEIGHT: 64 IN | BODY MASS INDEX: 39.61 KG/M2 | TEMPERATURE: 97.7 F | HEART RATE: 86 BPM | RESPIRATION RATE: 18 BRPM | DIASTOLIC BLOOD PRESSURE: 61 MMHG | OXYGEN SATURATION: 98 % | WEIGHT: 232 LBS

## 2025-03-14 PROCEDURE — 250N000013 HC RX MED GY IP 250 OP 250 PS 637

## 2025-03-14 PROCEDURE — 120N000001 HC R&B MED SURG/OB

## 2025-03-14 RX ADMIN — ACETAMINOPHEN 650 MG: 325 TABLET ORAL at 14:53

## 2025-03-14 RX ADMIN — PSYLLIUM HUSK 1 PACKET: 3.4 POWDER ORAL at 09:27

## 2025-03-14 RX ADMIN — IBUPROFEN 800 MG: 800 TABLET ORAL at 22:07

## 2025-03-14 RX ADMIN — IBUPROFEN 800 MG: 800 TABLET ORAL at 14:52

## 2025-03-14 RX ADMIN — ACETAMINOPHEN 650 MG: 325 TABLET ORAL at 22:07

## 2025-03-14 RX ADMIN — ACETAMINOPHEN 650 MG: 325 TABLET ORAL at 04:14

## 2025-03-14 ASSESSMENT — ACTIVITIES OF DAILY LIVING (ADL)
ADLS_ACUITY_SCORE: 27

## 2025-03-14 NOTE — PLAN OF CARE
Goal Outcome Evaluation:      Plan of Care Reviewed With: patient    Overall Patient Progress: improvingOverall Patient Progress: improving    Outcome Evaluation: Plan of care reviewed. Patient 9.5/100/0 on 12 of pitocin. Patient feeling increased pressure with contractions. Patient afebrile with elevated BPs. Patient plan of care improving.

## 2025-03-14 NOTE — PLAN OF CARE
Problem: Adult Inpatient Plan of Care  Goal: Plan of Care Review  Description: The Plan of Care Review/Shift note should be completed every shift.  The Outcome Evaluation is a brief statement about your assessment that the patient is improving, declining, or no change.  This information will be displayed automatically on your shift  note.  Outcome: Progressing  Flowsheets (Taken 3/14/2025 1427)  Plan of Care Reviewed With: patient     Problem: Postpartum (Vaginal Delivery)  Goal: Successful Parent Role Transition  Outcome: Progressing  Goal: Optimal Pain Control and Function  Outcome: Progressing     Problem: Breastfeeding  Goal: Effective Breastfeeding  Outcome: Progressing   Goal Outcome Evaluation:      Plan of Care Reviewed With: patient      Patient's VSS except last BP of 141/73. Fundus firm with scant bleeding. Clonus absent. +1 Trace edema in lower extremities.Mother has been having difficulty breastfeeding. She had a Lactation consult this morning. She states that it is more difficult to feed on the right breast. She noticed nipple tenderness. Nipple cream applied. She felt overwhelmed in not getting colostrum out. Baby was also very sleepy during breastfeeding session. Donor milk was given successfully. Mother bonding well with baby and attentive to cares.

## 2025-03-14 NOTE — PROVIDER NOTIFICATION
Called Patricia Colin to update that the patient has had two consecutive blood pressures that were 141/73 and 140/63. She said to just keep checking every 4 hours and to call if she starts complaining of preeclampsia symptoms or reaches 160/110.

## 2025-03-14 NOTE — PROGRESS NOTES
"Patient Name:  Marcia Herrera  :      2000  MRN:      [id number]    Subjective:  Marcia Herrera is coping well with contractions. Using non pharmacologic  for pain relief. Good PO fluid intake.   Voiding without issue. Family supportive at bedside. She reports feeling period like cramping with contractions but still is relatively comfortable.She is agreeable to SVE at this time.    Objective:  /78   Temp 98.4  F (36.9  C) (Oral)   Resp 17   Ht 1.626 m (5' 4\")   Wt 105.2 kg (232 lb)   LMP 2024   SpO2 100%   BMI 39.82 kg/m      FHR:Baseline: 145 bpm, Variability: Moderate (6 - 25 bpm), Accelerations: present and Decelerations: Absent    Uterine contractions:TocoFrequency: Every 3-7 minutes, Duration: 40-80 seconds and Intensity: mild    SVE:5/60/-1    Assessment:     at 39w1d  Induction of labor, PROM  Category 1 FHTs  NST reactive  GBS negative  SROM x23 hours    Plan:   -Continue position changes to optimize fetal descent.  -Membrane sweep done with SVE  -Pitocin break with Tums done this morning, titration was restarted. Plan to increase per protocol as patient and baby tolerate.  -Discussed nearing 24 hours of SROM with Dr. Melendrez who recommended 24 hours of pitocin, and if no cervical change at that time we will consider operative route of delivery. Patient verbalized understanding.  -Routine support & management. Encourage position changes, ambulation, rest as desired.  -Anticipate progress and NSVB.   -Reevaluate progress in 4-6 hours or sooner with a change in status.     Dr. Melendrez aware of patient status and remains available for consultation and collaboration as needed.    Provider:  HUMERA [facility]wood [facility]    Date:  3/13/2025  Time:  7:14 PM  "

## 2025-03-14 NOTE — LACTATION NOTE
This note was copied from a baby's chart.  Initial Lactation Visit    Current age: 13 hours old    Gestational age at delivery: 39w1d     Diaper count (last 24 hours): 2 wet, 4 soiled      Feedings(last 24 hours): 3 breast      Weight Loss: pending 24 hours    Breastfeeding goals:  undecided    Past breastfeeding experience: none/prime    Labor and Delivery Events that may affect breastfeeding: Induction/augmentation    Maternal risk that may affect breastfeeding: Hypertension, High BMI    Home Pump: Unimom Opera+    Breast assessment: Breast: Round, Pendulous , Large, Symmetrical, and Soft , Nipples: Everted, short and Intact    Feeding assessment: present after feeding.  Baby fussy then sleepy after burping.    Visit Summary: Educated/reviewed importance of achieving an asymmetrical pain free latch with breastfeeding parent's nipple pointed toward baby's nose to open the breast tissue for the lower jaw and tongue to latch.. Demonstrated a deep asymmetrical latch in a football position with vertical maternal pillow support to allow for full arm and baby ROM.  Breast compression encouraged to optimize milk transfer.     Feeding plan:  ad mario on demand at least 8x/24 hours, not to exceed 3 hours from start of feeding to start of next feeding.    Education:   [x] [place] Feeding Patterns in the First Few Days/second Night   [] Maternal Risk Factors that Could Affect Milk Supply  [x] Hand Expression   [] Stages of Milk Production   [x] Feeding Cues     [] LPI Feeding Behaviors  [] LBW Feeding Behaviors  [x] Rousing Techniques  [x] Benefits of Skin-to-Skin     [x] Breastfeeding Positions  [x] Tips to Maintain a Deep Latch      [] Nutritive vs Non-Nutritive Sucking   [] Gentle Breast Compressions  [] [place] Weight Loss  [] How to Know When Baby is Getting Enough to Eat  [] Supplementation & methods (including Paced Bottle Feeding)    [] Nipple Shield  [] Sore Nipples  [] Pacifier Use  [] Tight Frenulum    []  Breastfeeding Twins  [] Pumping Plan/Flange fit and comfort  [] Breast pump Education  [] Engorgement/Reverse Pressure Softening  [x] Inpatient Lactation Support  [] Outpatient Lactation Resources/Follow up    Follow up: LC will continue to follow up during hospital stay.

## 2025-03-14 NOTE — PROGRESS NOTES
"Vaginal Delivery Postpartum Day 1    Patient Name:  Marcia Herrera  :      2000  MRN:      [id number]      Assessment:    Normal postpartum course.    Plan:    Continue current care.  Discharge home tomorrow      Subjective:  The patient feels well:  Voiding without difficulty, lochia normal, tolerating normal diet, ambulating without difficulty and passing flatus. Pain is well controlled with current medications.  The patient has no emotional concerns.  The baby is well and being fed by breast.      YOB: 2023  Birth Time: 8:31 PM    Prenatal Complications include:   Tobacco use (vaped throughout pregnancy)  History of drug use: meth, cocaine, and marijuana (stopped meth and cocaine with +UPT, continued marijuana use)  Depression (diagnosed age 18)  Thoughts of self-harm at 20 wks (improved after moving in with family, declined starting medication, monitored closely for rest of pregnancy- no further concerns. 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  FGR at 32 weeks (EFW 11%, AC 2.8%), resolved at 35 weeks     Objective:  /75 (BP Location: Right arm, Patient Position: Semi-Munroe's, Cuff Size: Adult Regular)   Pulse 81   Temp 98.5  F (36.9  C) (Oral)   Resp 18   Ht 1.626 m (5' 4\")   Wt 101.2 kg (223 lb)   LMP 2024   SpO2 99%   Breastfeeding Unknown   BMI 38.28 kg/m    Patient Vitals for the past 24 hrs:   BP Temp Temp src Pulse Resp SpO2 Weight   25 0730 123/75 98.5  F (36.9  C) Oral 81 18 99 % --   25 0414 123/61 98.2  F (36.8  C) Oral 90 18 97 % 101.2 kg (223 lb)   25 0000 122/61 97.7  F (36.5  C) Oral 86 18 98 % --   25 2250 138/84 -- -- -- 18 -- --   254 129/66 -- -- -- 18 -- --   03/13/25 2218 110/59 -- -- -- 18 -- --   25 118/59 -- -- -- 18 -- --   25 2148 132/69 " -- -- -- 18 -- --   03/13/25 2133 132/62 -- -- -- 18 -- --   03/13/25 2127 -- -- -- -- -- 97 % --   03/13/25 2122 -- -- -- -- -- 96 % --   03/13/25 2119 (!) 143/62 -- -- -- 18 -- --   03/13/25 2117 -- -- -- -- -- 97 % --   03/13/25 2112 -- -- -- -- -- 98 % --   03/13/25 2107 -- -- -- -- -- 97 % --   03/13/25 2102 -- -- -- -- -- 97 % --   03/13/25 2057 -- -- -- -- -- 98 % --   03/13/25 2052 -- -- -- -- -- 97 % --   03/13/25 2048 109/72 98.6  F (37  C) Oral -- 18 -- --   03/13/25 2047 -- -- -- -- -- 97 % --   03/13/25 2042 -- -- -- -- -- 94 % --   03/13/25 2041 112/71 -- -- -- 18 92 % --   03/13/25 2037 -- -- -- -- -- 97 % --   03/13/25 2033 111/82 -- -- -- 20 -- --   03/13/25 2032 -- -- -- -- -- (!) 87 % --   03/13/25 2026 -- -- -- -- -- 98 % --   03/13/25 2021 -- -- -- -- -- 97 % --   03/13/25 2015 -- -- -- -- -- 98 % --   03/13/25 2013 (!) 155/90 -- -- -- 20 -- --   03/13/25 2010 -- -- -- -- -- 100 % --   03/13/25 2005 -- -- -- -- -- 99 % --   03/13/25 2001 -- -- -- -- -- (!) 91 % --   03/13/25 2000 -- -- -- -- -- 99 % --   03/13/25 1959 (!) 164/105 -- -- -- -- -- --   03/13/25 1955 -- -- -- -- -- 100 % --   03/13/25 1950 -- -- -- -- -- 99 % --   03/13/25 1945 -- -- -- -- -- 99 % --   03/13/25 1940 -- -- -- -- -- 99 % --   03/13/25 1935 -- -- -- -- -- 100 % --   03/13/25 1934 (!) 177/114 -- -- -- 20 -- --   03/13/25 1930 -- -- -- -- -- 98 % --   03/13/25 1925 -- -- -- -- -- 98 % --   03/13/25 1920 -- -- -- -- -- 98 % --   03/13/25 1915 -- -- -- -- -- 98 % --   03/13/25 1910 -- -- -- -- -- 99 % --   03/13/25 1905 -- -- -- -- -- 100 % --   03/13/25 1903 (!) 148/103 -- -- -- -- -- --   03/13/25 1900 -- -- -- -- -- 100 % --   03/13/25 1855 -- -- -- -- -- 100 % --   03/13/25 1850 -- -- -- -- -- 99 % --   03/13/25 1845 -- -- -- -- -- 99 % --   03/13/25 1840 -- -- -- -- -- 100 % --   03/13/25 1835 -- -- -- -- -- 99 % --   03/13/25 1830 -- -- -- -- -- 100 % --   03/13/25 1827 -- 98.4  F (36.9  C) Oral -- 17 99 % --    03/13/25 1825 -- -- -- -- -- 99 % --   03/13/25 1820 -- -- -- -- -- 99 % --   03/13/25 1815 -- -- -- -- -- 100 % --   03/13/25 1810 -- -- -- -- -- 99 % --   03/13/25 1805 -- -- -- -- -- 99 % --   03/13/25 1800 -- -- -- -- -- 100 % --   03/13/25 1755 -- -- -- -- -- 99 % --   03/13/25 1750 -- -- -- -- -- 100 % --   03/13/25 1747 124/69 -- -- -- -- -- --   03/13/25 1745 -- -- -- -- -- 100 % --   03/13/25 1740 -- -- -- -- -- 100 % --   03/13/25 1735 -- -- -- -- -- 100 % --   03/13/25 1730 -- -- -- -- -- 99 % --   03/13/25 1729 139/82 -- -- -- -- -- --   03/13/25 1725 -- -- -- -- -- 99 % --   03/13/25 1723 -- -- -- -- -- (!) 91 % --   03/13/25 1720 -- -- -- -- -- 99 % --   03/13/25 1719 130/78 -- -- -- 16 -- --   03/13/25 1715 -- -- -- -- -- 100 % --   03/13/25 1712 -- -- -- -- -- 93 % --   03/13/25 1710 (!) 143/63 -- -- -- 15 (!) 86 % --   03/13/25 1706 -- -- -- -- -- (!) 90 % --   03/13/25 1705 -- -- -- -- -- 93 % --   03/13/25 1704 139/76 -- -- -- -- -- --   03/13/25 1700 -- -- -- -- -- (!) 90 % --   03/13/25 1657 131/82 -- -- -- -- -- --   03/13/25 1655 133/77 -- -- -- -- 92 % --   03/13/25 1653 -- -- -- -- -- 92 % --   03/13/25 1651 (!) 157/76 -- -- -- -- -- --   03/13/25 1650 -- -- -- -- -- 99 % --   03/13/25 1649 (!) 148/70 97.7  F (36.5  C) Oral -- -- -- --   03/13/25 1646 (!) 141/92 -- -- -- 21 -- --   03/13/25 1645 -- -- -- -- -- 100 % --   03/13/25 1640 -- -- -- -- -- 99 % --   03/13/25 1639 135/86 -- -- -- -- -- --   03/13/25 1638 -- -- -- -- -- (!) 91 % --   03/13/25 1635 -- -- -- -- -- 97 % --   03/13/25 1633 136/80 -- -- -- -- 94 % --   03/13/25 1630 (!) 140/77 -- -- -- -- (!) 91 % --   03/13/25 1628 -- -- -- -- -- 92 % --   03/13/25 1625 -- -- -- -- -- 98 % --   03/13/25 1622 -- -- -- -- -- 93 % --   03/13/25 1620 -- -- -- -- -- 100 % --   03/13/25 1615 -- -- -- -- -- 98 % --   03/13/25 1614 -- -- -- -- -- 94 % --   03/13/25 1610 -- -- -- -- -- 98 % --   03/13/25 1605 -- -- -- -- -- 99 % --   03/13/25  1600 -- -- -- -- -- 98 % --   03/13/25 1455 -- -- -- -- -- 95 % --   03/13/25 1453 131/77 98.3  F (36.8  C) -- -- 20 -- --   03/13/25 1430 (!) 150/102 -- -- -- 22 -- --   03/13/25 1237 139/73 98.3  F (36.8  C) Oral -- 18 -- --   03/13/25 1123 135/67 98.1  F (36.7  C) Oral -- 20 -- --   03/13/25 1048 (!) 144/89 -- -- -- 18 -- --   03/13/25 1021 -- 97.6  F (36.4  C) Oral -- -- -- --       Exam: Patient A&O x 3. No acute distress, breathing unlabored.The amount and color of the lochia is appropriate for the duration of recovery. Uterine fundus is firm at U. Perineum:  no c/o       Lab Results   Component Value Date    AS Negative 03/12/2025    HGB 13.6 03/13/2025         There is no immunization history on file for this patient.    Provider:  HUMERA Camacho CNM    Date:  3/14/2025  Time:  9:45 AM

## 2025-03-14 NOTE — PROGRESS NOTES
D:  Patient admitted to [facility]/TXZC36-5 via wheelchair with  and support person.  A:  Bedside report received from Mallory BARAHONA Oriented patient and family to surroundings; call light within reach. 4 Part ID bands double checked with reporting RN.  R:  Patient and  tolerated transfer. Care assumed.    Tammie Loomis RN on 3/14/2025 at 12:27 AM

## 2025-03-14 NOTE — PROGRESS NOTES
Ruptured forebag at approximately 1500 with small amount of clear fluid. Attempted to place IUPC at this time as well due to being at 20 mU of pitocin with patient being comfortable. She did not tolerate well so attempt was discontinued. Patient reported that contractions immediately became much more uncomfortable and requested nitrous oxide for pain control.  She is considering epidural if needed.     Patient was also found to have several mildly elevated BP readings since 1100, [facility] labs were collected and were WNL. If BP remains elevated over next hour, she will then meet criteria for GHTN. Will notify Dr. Parvin MD on call of any new onset symptoms or severe range pressures requiring treatment. Plan to reassess in 4 hours, or sooner as needed.    HUMERA Blackwood

## 2025-03-14 NOTE — PROGRESS NOTES
Data: Marcia Herrera transferred to Rm 13 via wheelchair at 2325. Baby transferred via parent's arms.  Action: Receiving unit notified of transfer: Yes. Patient and family notified of room change. Report given to Tammie MCNAIR at 2330. Belongings sent to receiving unit. Accompanied by Registered Nurse. Oriented patient to surroundings. Call light within reach. ID bands double-checked with receiving RN.  Response: Patient tolerated transfer and is stable.

## 2025-03-14 NOTE — PLAN OF CARE
Goal Outcome Evaluation:      Plan of Care Reviewed With: patient    Overall Patient Progress: improving    Outcome Evaluation: Marcia's vital signs and OB assessments WNL. BPs stable postpartum - to stay consistent, using adult large maroon cuff as that is what she states was used during labor. Reflexes +2, no clonus, trace edema in feet. Fundus is firm, bleeding is scant. She passed her voiding trials. Taking tylenol for cramping pain. Breastfeeding well but noticing some nipple tenderness - she has cream from home. Paperwork including ROP was introduced.    Tammie Loomis RN on 3/14/2025 at 5:04 AM

## 2025-03-14 NOTE — L&D DELIVERY NOTE
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  Her hospital course was complicated by GHTN which remained asymptomatic and did not require medication . Patient progressed to complete with pitocin and rupture of forebag .     The fetal head delivered in AMANDA position, followed by the anterior shoulder, which delivered easily with gentle downward traction paired with maternal efforts.     Shoulder Dystocia No  Operative Vaginal Delivery No    Infant:  Live, vigorous infant was handed to mom.      Nuchal cord No  After a 5 minute delay, the umbilical cord was clamped x 2 and cut by father of baby.    GBS: Negative    Labor Details:  Augmentation Yes  Indication: PROM  Induction Yes          Indication: PROM  ROM: PROM  Fluid Type: Clear  Monitors: External  Labor Analgesia/Anesthesia:Nitrous oxide and Epidural    Placenta:  Placenta spontaneously delivered: 3/13/2025  8:41 PM grossly intact with 3 vessel cord.  Placenta Date/Time: 3/13/2025  8:41 PM  Placenta submitted to Pathology: No  Cord pH obtained: No   Third stage interventions included: routine IV pitocin and fundal massage    Laceration:   intact perineum.   Delivery QBL (mL): 50    Mother and Infant both  brian.    Dr. Melendrez remains available for consultation and collaboration as needed.      HUMERA Blackwood CNM   3/13/2025 9:31 PM

## 2025-03-14 NOTE — PLAN OF CARE
Problem: Adult Inpatient Plan of Care  Goal: Optimal Comfort and Wellbeing  Outcome: Progressing  Intervention: Monitor Pain and Promote Comfort  Recent Flowsheet Documentation  Taken 3/13/2025 2234 by Mallory Kidd RN  Pain Management Interventions:   care clustered   pillow support provided   quiet environment facilitated   relaxation techniques promoted   repositioned   rest  Intervention: Provide Person-Centered Care  Recent Flowsheet Documentation  Taken 3/13/2025 1945 by Mallory Kidd RN  Trust Relationship/Rapport:   care explained   choices provided   emotional support provided   empathic listening provided   questions answered   questions encouraged   reassurance provided   thoughts/feelings acknowledged     Problem: Postpartum (Vaginal Delivery)  Goal: Successful Parent Role Transition  Outcome: Progressing  Intervention: Support Parent Role Transition  Recent Flowsheet Documentation  Taken 3/13/2025 2213 by Mallory Kidd RN  Supportive Measures:   active listening utilized   relaxation techniques promoted   self-care encouraged   self-reflection promoted   self-responsibility promoted   verbalization of feelings encouraged  Taken 3/13/2025 2103 by Mallory Kidd RN  Parent-Child Attachment Promotion:   cue recognition promoted   face-to-face positioning promoted   interaction encouraged   parent/caregiver presence encouraged   participation in care promoted   positive reinforcement provided   skin-to-skin contact encouraged   rooming-in promoted   strengths emphasized   caring behavior modeled  Taken 3/13/2025 1945 by Mallory Kidd RN  Supportive Measures:   active listening utilized   positive reinforcement provided   self-care encouraged   relaxation techniques promoted   self-responsibility promoted   verbalization of feelings encouraged     Problem: Postpartum (Vaginal Delivery)  Goal: Anesthesia/Sedation Recovery  Outcome: Progressing     Problem: Postpartum (Vaginal Delivery)  Goal:  Effective Urinary Elimination  Outcome: Progressing     Problem: Breastfeeding  Goal: Effective Breastfeeding  Outcome: Progressing  Intervention: Promote Breast Care and Comfort  Recent Flowsheet Documentation  Taken 3/13/2025 2148 by Mallory Kidd RN  Breast Care: Breastfeeding: open to air  Intervention: Promote Effective Breastfeeding  Recent Flowsheet Documentation  Taken 3/13/2025 2148 by Mallory Kidd RN  Breastfeeding Assistance:   assisted with positioning   feeding cue recognition promoted   feeding on demand promoted   feeding session observed   infant latch-on verified   support offered  Taken 3/13/2025 2103 by Mallory Kidd RN  Parent-Child Attachment Promotion:   cue recognition promoted   face-to-face positioning promoted   interaction encouraged   parent/caregiver presence encouraged   participation in care promoted   positive reinforcement provided   skin-to-skin contact encouraged   rooming-in promoted   strengths emphasized   caring behavior modeled  Intervention: Support Exclusive Breastfeeding Success  Recent Flowsheet Documentation  Taken 3/13/2025 2213 by Mallory Kidd RN  Supportive Measures:   active listening utilized   relaxation techniques promoted   self-care encouraged   self-reflection promoted   self-responsibility promoted   verbalization of feelings encouraged  Taken 3/13/2025 1945 by Mallory Kidd RN  Supportive Measures:   active listening utilized   positive reinforcement provided   self-care encouraged   relaxation techniques promoted   self-responsibility promoted   verbalization of feelings encouraged   Goal Outcome Evaluation:      Plan of Care Reviewed With: patient, significant other    Overall Patient Progress: improvingOverall Patient Progress: improving     IOL with . Had elevated BP in labor, BP was during a ctx and BP cuff was too small. Regular cuff used on forearm. CNM was aware and present for some of the elevated readings. FFU, sm-sct rubra flow no  clots or free flow. Voided 350 in bathroom and is independent with ADL's. Breast fed baby well, initiates cares and is receptive to infant cues. Stable postpartum recovery. Pt refused the pulse ox during recovery and declined a second bag of Pitocin as the bag for IOL had approx 250 remaining.

## 2025-03-14 NOTE — PROGRESS NOTES
1907: Provider notified last BP was 148/103 with previous high Bps. Provider orders q15min BP checks and will put in the [place] order set.

## 2025-03-14 NOTE — CONSULTS
"INITIAL SOCIAL WORK MATERNITY ASSESSMENT     DATA:      Reason for Social Work Consult: Tox Screening/Community Resources     Presenting Information: Pt's UA tested positive for THC. Confirmatory screening is pending at this time. Pt admitted to cocaine and meth use during the first trimester before she found out she was pregnant. This is Pt's first child.     Living Situation: Pt lives in her grandparents house with her significant other/MICHAELA Mg.      Social Support/Professional Community Support:  Pt reported that she has a really strong support system from family and friends. Pt identified Saurav as a very strong and supportive partner. Pt is not connected with any social supports or programs at this time.     Insurance: [employer]     Source of Financial Support: Pt was working as a  but now is going to be on unpaid leave. MICHAELA Mg has a temp job that he started recently and will continue to work even once Baby discharges home. Pt's family is supportive if needed.    Baby Supplies: Pt confirms she has all needed supplies for baby at home.      Mental Health History: Pt reported she had a suicide attempt by overdose when she was 17 years old. Pt was admitted to an IP Psych unit at that time. Pt denies any mental health history until about a month she found out she was pregnant. Pt noted that she had an  two months prior to this pregnancy. Pt noted she felt very emotional about the  and then finding out she was pregnant. Pt noted the start of her second trimester felt like a switch had turned on and she has been very excited about becoming a mother ever since.      History of Postpartum Mood Disorders: N/A - first child     Chemical Health History: Pt admitted to having used cocaine at a party two days before finding out she was pregnant. Pt stated that she was unaware that the cocaine was \"cut with meth\" at the time. Pt noted that she has only ever used cocaine at that party " "and hasn't since. Pt noted that she used to smoke marijuana frequently prior to finding out she was pregnant. Pt stated she nearly stopped using THC when finding out she was pregnant but then started using it again to help with nausea throughout the pregnancy. Pt noted that she did try to use Zofran first but it did not work for her. Pt denies any further history of substance use or substance abuse issues. MICHAELA Mg also uses THC but Pt stated he also cut back his use when finding out Pt was pregnant.         INTERVENTION:      SW completed chart review and collaborated with the multidisciplinary team.   Psychosocial Assessment   Introduction to Maternal Child Health  role and scope of practice   Reviewed Hospital and Community Resources   Assessed Chemical Health History and Current Symptoms   Assessed Mental Health History and Current Symptoms   Identified stressors, barriers and family concerns   Provided support and active empathetic listening and validation.   Provided psychoeducation on  mood and anxiety disorders, assessed for any current symptoms or history    ASSESSMENT:    Pt appeared to be very open with SW throughout the conversation and had shared information about her mental health and chemical health history. Pt has some noted stressors from not having an income during this time and also this being her first child. Pt expressed that she has a very strong support system and feels that she will be able to find addition support resources if needed in the future. Pt noted that she had declined resources for a mental health provider from her OB early in her pregnancy but she is not opposed to finding one if she feels her mental health declines. Pt endorsed feeling such great nelson and happiness to be a mother and stated she feels \"so blessed.\" Pt stated feeling safe in her living environment and also noted having all baby supplies needed. Pt plans to follow closely with her OB after " discharging from the hospital.     PLAN:   SW provided Pt with the Resources for Parents list and noted the crisis intervention numbers on the backside of the page. Pt was accepting and appreciative of the resource. SW noted that CM may stop by tomorrow to check-in. Pt was agreeable with CM checking in again tomorrow.     Nursing updated. CM will monitor for results from Baby's cord tox screening.       GODFREY Delatorre

## 2025-03-15 VITALS
RESPIRATION RATE: 18 BRPM | OXYGEN SATURATION: 96 % | SYSTOLIC BLOOD PRESSURE: 124 MMHG | TEMPERATURE: 98.6 F | HEIGHT: 64 IN | DIASTOLIC BLOOD PRESSURE: 59 MMHG | WEIGHT: 220.2 LBS | BODY MASS INDEX: 37.59 KG/M2 | HEART RATE: 88 BPM

## 2025-03-15 PROBLEM — O13.3 GESTATIONAL HYPERTENSION, THIRD TRIMESTER: Status: ACTIVE | Noted: 2025-03-15

## 2025-03-15 PROBLEM — O13.9 GESTATIONAL HYPERTENSION: Status: ACTIVE | Noted: 2025-03-15

## 2025-03-15 PROBLEM — Z34.90 ENCOUNTER FOR INDUCTION OF LABOR: Status: ACTIVE | Noted: 2025-03-15

## 2025-03-15 PROCEDURE — 250N000013 HC RX MED GY IP 250 OP 250 PS 637

## 2025-03-15 RX ORDER — POLYETHYLENE GLYCOL 3350 17 G/17G
1 POWDER, FOR SOLUTION ORAL DAILY
Qty: 510 G | Refills: 1 | Status: SHIPPED | OUTPATIENT
Start: 2025-03-15

## 2025-03-15 RX ORDER — LABETALOL 200 MG/1
200 TABLET, FILM COATED ORAL EVERY 12 HOURS SCHEDULED
Status: DISCONTINUED | OUTPATIENT
Start: 2025-03-15 | End: 2025-03-15 | Stop reason: HOSPADM

## 2025-03-15 RX ORDER — LABETALOL 100 MG/1
200 TABLET, FILM COATED ORAL EVERY 12 HOURS
Qty: 60 TABLET | Refills: 1 | Status: SHIPPED | OUTPATIENT
Start: 2025-03-15

## 2025-03-15 RX ORDER — IBUPROFEN 800 MG/1
800 TABLET, FILM COATED ORAL EVERY 6 HOURS PRN
Qty: 30 TABLET | Refills: 1 | Status: SHIPPED | OUTPATIENT
Start: 2025-03-15

## 2025-03-15 RX ORDER — ACETAMINOPHEN 325 MG/1
650 TABLET ORAL EVERY 4 HOURS PRN
Qty: 60 TABLET | Refills: 1 | Status: SHIPPED | OUTPATIENT
Start: 2025-03-15 | End: 2025-03-25

## 2025-03-15 RX ADMIN — PSYLLIUM HUSK 1 PACKET: 3.4 POWDER ORAL at 09:36

## 2025-03-15 RX ADMIN — IBUPROFEN 800 MG: 800 TABLET ORAL at 09:35

## 2025-03-15 RX ADMIN — ACETAMINOPHEN 650 MG: 325 TABLET ORAL at 03:42

## 2025-03-15 RX ADMIN — LABETALOL HYDROCHLORIDE 200 MG: 200 TABLET ORAL at 10:48

## 2025-03-15 RX ADMIN — ACETAMINOPHEN 650 MG: 325 TABLET ORAL at 09:35

## 2025-03-15 ASSESSMENT — ACTIVITIES OF DAILY LIVING (ADL)
ADLS_ACUITY_SCORE: 26
ADLS_ACUITY_SCORE: 24
ADLS_ACUITY_SCORE: 26
ADLS_ACUITY_SCORE: 24
ADLS_ACUITY_SCORE: 26
ADLS_ACUITY_SCORE: 26
ADLS_ACUITY_SCORE: 24
ADLS_ACUITY_SCORE: 27
ADLS_ACUITY_SCORE: 26
ADLS_ACUITY_SCORE: 24
ADLS_ACUITY_SCORE: 26
ADLS_ACUITY_SCORE: 24
ADLS_ACUITY_SCORE: 26
ADLS_ACUITY_SCORE: 24

## 2025-03-15 NOTE — DISCHARGE INSTRUCTIONS
Warning Signs after Having a Baby    Keep this paper on your fridge or somewhere else where you can see it.    Call your provider if you have any of these symptoms up to 12 weeks after having your baby.    Thoughts of hurting yourself or your baby  Pain in your chest or trouble breathing  Severe headache not helped by pain medicine  Eyesight concerns (blurry vision, seeing spots or flashes of light, other changes to eyesight)  Fainting, shaking or other signs of a seizure    Call 9-1-1 if you feel that it is an emergency.     The symptoms below can happen to anyone after giving birth. They can be very serious. Call your provider if you have any of these warning signs.    My provider s phone number: _______________________    Losing too much blood (hemorrhage)    Call your provider if you soak through a pad in less than an hour or pass blood clots bigger than a golf ball. These may be signs that you are bleeding too much.    Blood clots in the legs or lungs    After you give birth, your body naturally clots its blood to help prevent blood loss. Sometimes this increased clotting can happen in other areas of the body, like the legs or lungs. This can block your blood flow and be very dangerous.     Call your provider if you:  Have a red, swollen spot on the back of your leg that is warm or painful when you touch it.   Are coughing up blood.     Infection    Call your provider if you have any of these symptoms:  Fever of 100.4 F (38 C) or higher.  Pain or redness around your stitches if you had an incision.   Any yellow, white, or green fluid coming from places where you had stitches or surgery.    Mood Problems (postpartum depression)    Many people feel sad or have mood changes after having a baby. But for some people, these mood swings are worse.     Call your provider right away if you feel so anxious or nervous that you can't care for yourself or your baby.    Preeclampsia (high blood pressure)    Even if you  didn't have high blood pressure when you were pregnant, you are at risk for the high blood pressure disease called preeclampsia. This risk can last up to 12 weeks after giving birth.     Call your provider if you have:   Pain on your right side under your rib cage  Sudden swelling in the hands and face    Remember: You know your body. If something doesn't feel right, get medical help.     For informational purposes only. Not to replace the advice of your health care provider. Copyright 2020 [place] [employer] [facility]. All rights reserved. Clinically reviewed by Jaqui León, RNC-OB, MSN. RPost 552282 - Rev 02/23.    Checking Your Blood Pressure at Home  During and after pregnancy  How do I measure my blood pressure?  It s important to take the readings at the same time each day, such as morning and evening. Take your blood pressure before taking any morning medications.  How to get the most accurate reading  30 minutes before checking your blood pressure, avoid the following:  Drinking caffeine  Drinking alcohol  Eating  Smoking  Exercising  5 minutes before checking your blood pressure:  Use the bathroom and urinate so you have an empty bladder.  Sit still in a chair for around 5 minutes. Stay calm and relaxed and do not talk if possible.  To check your blood pressure:     Sit up straight in a chair.  Place your feet on the floor. Don t cross your ankles or legs.  Rest your arm at the level of your heart on a table or desk or on the arm of a chair. Use the same arm every day.  Pull up your shirt sleeve. Don t take the measurement over clothes.  Wrap the blood pressure cuff around the upper part of your left arm, 1 inch (2.5 cm) above your elbow.  Fit the cuff snugly around your arm. You should be able to place only one finger between the cuff and your arm.  Position the cord so that it rests in the bend of your elbow.  Press the power button.  Sit quietly while the cuff inflates and deflates.  Read the  digital reading on the monitor screen and write the numbers down (record them) in a notebook.  Wait 2-3 minutes, then repeat the steps, starting at step 1.  Which features do you need?  Arm cuff monitors give the most exact readings.  Wrist and finger blood pressure monitors are often less exact.  Pick a blood pressure monitor that has passed tests to show they measure exactly. Blood pressure cuffs for sale in the [place] that have passed tests are listed on the website www.validatebp.org.  Some monitors that have passed tests are:  Omron 3 Series Upper Arm Blood Pressure Monitor (Model XJ2947)  Omron 5 Series Upper Arm Blood Pressure Monitor (Model SL2585)  Omron 7 Series Upper Arm Blood Pressure Monitor (Model HEM-7320)  [employer] Upper Arm Blood Pressure Monitor with Talking Function (UA 1030T)  Don t use smartphone apps. There are many smartphone apps that claim to check your blood pressure using the pulse in your wrist or finger. These don t work. They haven t passed any tests. Don t give your clinic a blood pressure reading from a smartphone damion.  If you have a flexible spending account (FSA) or health savings account (HSA), you may wish to pay yourself back (reimburse) for the machine and cuff. A blood pressure monitor is an allowed over-the- counter (OTC) item to pay yourself back from these accounts.  Cuff size  The size of the arm cuff is a key feature. Make sure the cuff is the right size for your arm. If the cuff isn t the right size, readings will either be too high or low.  To know what size cuff to buy, measure the distance around your bicep (upper arm).  Use a flexible measuring tape or . Place the measuring tape correction between your armpit and elbow. Measure the distance around your arm in inches.  You may need to buy a cuff apart from the machine to get the right size.  Cuff sizes and arm measurements  Small adult: 22 to 26 cm (8.7 to 10.2 inches)  Adult: 27 to 34 cm (10.6 to 13.4  "inches)  Large adult: 35 to 44 cm (13.8 to 17.3 inches)  Adult thigh: 45 to 52 cm (17.7 to 20.5 inches)    Copyright statement\" content=\"For informational purposes only. Not to replace the advice of your health care provider. Photo: ID [id number]   [employer]. Text copyright   2023 [facility]. All rights reserved. Clinically reviewed by [facility]. Wave - Private Location App [id number] - REV 03/23.    "

## 2025-03-15 NOTE — DISCHARGE SUMMARY
OB Discharge Summary      Date:  3/15/2025    Name:  Marcia Herrera  :  2000  MRN:  [id number]      Admission Date:  3/12/2025  Delivery Date: 3/13/2025  Gestational Age at Delivery:  39w1d  Discharge Date:  3/15/2025    Principal Diagnosis:    Patient Active Problem List   Diagnosis    Premature rupture of membranes     (normal spontaneous vaginal delivery)    Gestational hypertension, third trimester    Encounter for induction of labor    Gestational hypertension     Conditions complicating Pregnancy:   Tobacco use (vaped throughout pregnancy)  History of drug use: meth, cocaine, and marijuana (stopped meth and cocaine with +UPT, continued marijuana use)  Depression (diagnosed age 18)  Thoughts of self-harm at 20 wks (improved after moving in with family, declined starting medication, monitored closely for rest of pregnancy- no further concerns. 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  Quantity: 60 tablet  Refills: 1     ibuprofen 800 MG tablet  Commonly known as: ADVIL/MOTRIN      Dose: 800 mg  Take 1 tablet (800 mg) by mouth every 6 hours as needed for other (first line or per patient preference for mild to moderate pain management).  Quantity: 30 tablet  Refills: 1     labetalol  100 MG tablet  Commonly known as: NORMODYNE      Dose: 200 mg  Take 2 tablets (200 mg) by mouth every 12 hours.  Quantity: 60 tablet  Refills: 1     polyethylene glycol 17 GM/Dose powder  Commonly known as: MIRALAX      Dose: 1 Capful  Take 17 g (1 Capful) by mouth daily.  Quantity: 510 g  Refills: 1            STOP taking      aspirin 81 MG EC tablet        Nix Creme Rinse 1 % external liquid  Generic drug: permethrin                  Where to get your medicines        These medications were sent to [id number] - [place], [place] - [address] AT [facility] [address]  [address], [place] [address]      Phone: [phone]   acetaminophen 325 MG tablet  ibuprofen 800 MG tablet  labetalol 100 MG tablet  polyethylene glycol 17 GM/Dose powder          Discharge Plan:               Follow up with a provider at [facility] in 3-5 days for a BP check, 6 weeks for routine postpartum visit                 Patient Instructions:    Physical activity: Ok to resume your normal activities.    Nothing in the vagina for 6 weeks.   Diet:  Regular                Medication:   Continue taking your prenatal vitamin.   You can take ibuprofen and tylenol as needed for pain- ibuprofen is more helpful for cramping, swelling, and muscular pain. Tylenol is more helpful for generalized pain.   You can continue taking a stool softener daily as needed for constipation.                 Blood pressure monitoring:  Take your blood pressure 3 times per day, as well as when you feel dizzy or have a headache.   Write down your numbers to bring to your appointment.   Sit on a couch or chair for 5-10 min before taking- ensure with both feet are on the floor and your arm is in a neutral position.   Call if your BP is 140+ systolic (top number) or 90+ diastolic (bottom number)  Skip your blood pressure medication if your BP is less than 110/60.     Call [facility] if you have any of the following:   Heavy  bleeding (filling one pad within one to two hours).  Blood clots larger than the size of a golf ball, especially with heavy bleeding.  Vaginal discharge with foul odor or green color.  Fever (oral temperature over 100.3 F).  Signs of bladder infection (burning, frequent urination)  Painful, hard lump in breast with red streaks and/or fever.  Worsening vaginal pain or tissue coming out of vagina.  Worsening abdominal pain or tenderness   Signs of depression or anxiety, affecting sleep or activities of daily living.  Headaches not resolved with Tylenol, visual disturbances, severe upper abdominal pain or burning.  BP of 140/90 or higher        Physician/CNM: HUMERA Gerardo CNM    Name:  Marcia VAZQUEZ Zaybeth  :  2000  MRN:  [id number]

## 2025-03-15 NOTE — PROGRESS NOTES
"Vaginal Delivery Postpartum Day 2    Patient Name:  Marica Herrera  :      2000  MRN:      [id number]      Assessment:    Normal postpartum course.   GHTN    Plan:    Continue current care.   Start 200 mg labetalol PO BID  Discharge home today pending BP control, follow-up in clinic in 3-5 days  Discussed monitoring postpartum mental health and calling the clinic with any concerns    Subjective:  The patient feels well:  Voiding without difficulty, lochia normal, tolerating normal diet, ambulating without difficulty and passing flatus. Pain is well controlled with current medications. She reports no emotional concerns. Denies headache, visual changes, RUQ pain, N/V, SOB, and dizziness. The baby is well and being fed by breast.      YOB: 2023  Birth Time: 8:31 PM    Prenatal Complications include:   Tobacco use (vaped throughout pregnancy)  History of drug use: meth, cocaine, and marijuana (stopped meth and cocaine with +UPT, continued marijuana use)  Depression (diagnosed age 18)  Thoughts of self-harm at 20 wks (improved after moving in with family, declined starting medication, monitored closely for rest of pregnancy- no further concerns. 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  FGR at 32 weeks (EFW 11%, AC 2.8%), resolved at 35 weeks   GHTN diagnosed during admission        Objective:  BP (!) 140/86 (BP Location: Right arm)   Pulse 97   Temp 98  F (36.7  C) (Oral)   Resp 18   Ht 1.626 m (5' 4\")   Wt 99.9 kg (220 lb 3.2 oz)   LMP 2024   SpO2 98%   Breastfeeding Unknown   BMI 37.80 kg/m    Patient Vitals for the past 24 hrs:   BP Temp Temp src Pulse Resp SpO2 Weight   03/15/25 1244 126/65 98.5  F (36.9  C) Oral -- 18 96 % --   03/15/25 0904 (!) 140/86 98  F (36.7  C) Oral 97 18 98 % --   03/15/25 0402 -- -- -- -- -- -- " 99.9 kg (220 lb 3.2 oz)   03/15/25 0343 (!) 145/83 97.7  F (36.5  C) Oral 87 18 96 % --   03/14/25 2343 (!) 146/94 98  F (36.7  C) Oral 78 18 100 % --   03/14/25 1959 125/77 97.7  F (36.5  C) Oral 68 20 96 % --   03/14/25 1542 (!) 140/63 98.3  F (36.8  C) Oral 93 20 97 % --       Exam: Patient A&O x 3. No acute distress, breathing unlabored.The amount and color of the lochia is appropriate for the duration of recovery. Nursing notes reviewed.     Lab Results   Component Value Date    AS Negative 03/12/2025    HGB 13.6 03/13/2025       Provider:  HUMERA Gerardo CNM    Date:  3/15/2025  Time:  9:26 AM

## 2025-03-15 NOTE — PLAN OF CARE
Blood pressure improved after Labetalol this morning. Denies headache, visual disturbances, epigastric pain, and edema is in lower extremities is not changed.      Postpartum assessment is within normal limits. Pain managed with Ibuprofen and Tylenol. [place]  depression scale completed, score of 10, social work visited with patient today regarding mood again and also discussed situation with FOB. Patient was concerned that she wouldn't be able to get the infant's car seat, because FOB left in car with it last evening. Today she feels confident FOB will return with car and car seat today.      Birth certificate being completed, at bedside. Provided information on ROP form, patient has at bedside. Patient desires to go home this evening. Attentive to infant and independent with cares.     Verna Enrique RN on 3/15/2025 at 2:07 PM

## 2025-03-15 NOTE — LACTATION NOTE
This note was copied from a baby's chart.  Follow up Lactation Visit    Current age:  35 hours old    Gestational age at delivery:  39w1d     Diaper count (last 24 hours):  5 wet 4 soiled      Feedings (last 24 hours):  1 breast and 7 Human donor breastmilk 10-15mL    Weight Loss:  5.6% at 32 hours    Breastfeeding goals:  pump and bottle    Past breastfeeding experience: none/prime    Labor and Delivery Events that may affect breastfeeding:  Induction/Augmentation    Maternal health risk that may affect breastfeeding:  Hypertension, High BMI    Home Pump:  Unimom Opera+    Visit Summary:  Marcia has been primarily bottle feeding her baby with PHDM. She has also been using the [employer] pump inconsistantly.  Discussing her goals, she wants her baby fed and would like it to be her breastmilk.  She is less concerned about feeding at the breast. They dyad has has trouble with latch. This LC offered to assist with latch and Marcia declined.   Provided resources for the [employer] for PHDM through the food shelf.      Feeding plan:  Encouraged skin to skin and latch attempts prior to bottle feeding if Marcia wants to get baby back to the breast for feeding.       Pump 15-20 minutes after breastfeeding to stimulate and collect breast milk.  Offer back pumped milk after every feeding until infant is meeting goals above     Feed Expressed Milk to Baby Using Amounts Below as a Guideline:    24-48 hours  is 5-15ml per feeding   48-72 hours is 15-30ml per feeding   72-96 hours is 30-60ml per feeding   96 hours and older follow healthcare providers recommendation    Give more as baby cues. If necessary, make up difference with donor milk or formula as a bridge until milk supply increases.   .      Education:   [] [place] Feeding Patterns in the First Few Days/second Night   [] Maternal Risk Factors that Could Affect Milk Supply  [] Hand Expression  [] Stages of Milk Production  [] Feeding Cues  [] LPI Feeding Behaviors  []  LBW Feeding Behaviors  [] Rousing Techniques  [x] Benefits of Skin to Skin  [] Breastfeeding Positions  [x] Tips to Maintain a Deep Latch               [] Nutritive vs Non-Nutritive Sucking   [] Gentle Breast Compressions  []  Weight Loss  [x] How to Know When Baby is Getting Enough to Eat  [x] Supplementation Methods  [x] Type of Supplement  [] Nipple Shield  [x] Sore Nipples  [] Pacifier Use  [] Tight Frenulum  [] Breastfeeding Twins  [x] Pumping Plan  [x] Breastpump Education  [x] Engorgement/Reverse Pressure Softening  [] Inpatient Lactation Support  [x] Outpatient Lactation Resources/Follow up    Follow up: LC will continue to follow up during hospital stay.

## 2025-03-15 NOTE — PLAN OF CARE
Data: Vital signs within normal limits. Postpartum checks within normal limits - see flow record. Patient eating and drinking normally. Patient able to empty bladder independently and is up ambulating. No apparent signs of infection.  Perineum  healing well. Patient performing self cares and is able to care for infant.  Action: Patient medicated during the shift for pain and cramping. See MAR. Patient reassessed within 1 hour after each medication and pain was improved - patient stated she was comfortable. Patient education done about discharge instructions for mom and baby including blood pressure cuff usage at home. See flow record.  Response: Positive attachment behaviors observed with infant. Support persons Saurav present.     Plan: Discharged on 03/15/2025 at 19:55.    Note while discharging the room door was open and I overheard patient and significant other arguing, I went in to make sure all was ok. I walked away to grab supplies and heard arguing again and as I was walking up to the room I saw the significant other throw something down on the ground. I intervened and asked if everyone was ok and offered to get some help if they needed it. Both the patient and significant other assured me everything was ok they are just working through some stuff right now. I asked if it was safe to discharge patient and baby home with significant other or if we should call a family member. I was assured by both patient and significant other that they both feel safe and that it is safe to discharge them to go home with the significant other. The situation settled down and both were calm and happy to bring baby home.    Goal Outcome Evaluation:      Plan of Care Reviewed With: patient

## 2025-03-15 NOTE — PROGRESS NOTES
"Social work follow-up from yesterday and orders received today for new needs.    This writer reviewed chart and met at bedside with patient. Patient states she is feeling well. She shares that she is so happy baby is here and she feels overjoyed.    Patient requested information on determining paternity. Resources provided for [facility] and patient was appreciative. She states she is uncertain who FOB is and she would like to determine this. Her significant other would like to know as well.    Patient reports significant other left last night and has the car seat and that she is having difficulty in reaching him today. She is worried that she will be discharged and now have the car seat. She did eventually reach him and he will bring it when patient is ready for discharge.    Patient had elevated depression screening score. Patient was willing to discuss this. She reports she is feeling well today. She reports she had been feeling more stressed and sad about a week prior to delivery  as she was anxious about how everything would go. She reports that now that baby is here she is \"over the moon\" with excitement.     Patient has plans for follow-up with her provider as well as babies. She denies having any thoughts of depression or suicide. She does have history of substance use and labs are pending for both her and baby.    SW to follow should needs arise.    SOILA Oates  "

## 2025-03-15 NOTE — PROGRESS NOTES
Informed CNM Naveen of patients last 3 blood pressures being in the normal range, sending patient home with blood pressure cuff and education on blood pressure monitoring.

## 2025-03-15 NOTE — PLAN OF CARE
"Goal Outcome Evaluation:  Patient's VSS, with exception of elevated blood pressures. Per patient care note from evening 3/14, provider only wanted to be notified if patient symptomatic and/or severe blood pressures. Patient arm circumference 34cm. Denies preeclampsia symptoms. Reflexes +2. Absent clonus. Fundus is firm 1 below umbilicus with scant lochia. Patient is up ad mario and performing self-cares independently. Encouraged patient to utilize breast pump, patient did not pump overnight. Reporting cramping and lower back pain being treated with tylenol and ibuprofen as requested. Patient's significant other was not present at bedside overnight.     Patient aware to fill out ROP, birth certificate and PPMA; has not yet completed them. At bedside.     BP (!) 145/83 (BP Location: Right arm, Patient Position: Semi-Munroe's, Cuff Size: Adult Regular)   Pulse 87   Temp 97.7  F (36.5  C) (Oral)   Resp 18   Ht 1.626 m (5' 4\")   Wt 99.9 kg (220 lb 3.2 oz)   LMP 06/12/2024   SpO2 96%   Breastfeeding Unknown   BMI 37.80 kg/m          Problem: Suicide Risk  Goal: Absence of Self-Harm  3/15/2025 0632 by Hollie Parekh RN  Outcome: Progressing     Problem: Postpartum (Vaginal Delivery)  Goal: Optimal Pain Control and Function  3/15/2025 0632 by Hollie Parekh RN  Outcome: Progressing     Problem: Postpartum (Vaginal Delivery)  Goal: Successful Parent Role Transition  3/15/2025 0632 by Hollie Parekh RN  Outcome: Progressing       Plan of Care Reviewed With: patient    Overall Patient Progress: improvingOverall Patient Progress: improving           "

## 2025-03-16 ENCOUNTER — NURSE TRIAGE (OUTPATIENT)
Dept: NURSING | Facility: CLINIC | Age: 25
End: 2025-03-16
Payer: COMMERCIAL

## 2025-03-20 LAB
CANNABINOIDS UR CFM-MCNC: 129 NG/ML
CARBOXYTHC/CREAT UR: 109 NG/MG CREAT

## 2025-03-24 ENCOUNTER — MEDICAL CORRESPONDENCE (OUTPATIENT)
Dept: HEALTH INFORMATION MANAGEMENT | Facility: CLINIC | Age: 25
End: 2025-03-24
Payer: COMMERCIAL

## 2025-05-10 ENCOUNTER — HEALTH MAINTENANCE LETTER (OUTPATIENT)
Age: 25
End: 2025-05-10

## 2025-07-15 ENCOUNTER — MEDICAL CORRESPONDENCE (OUTPATIENT)
Dept: HEALTH INFORMATION MANAGEMENT | Facility: CLINIC | Age: 25
End: 2025-07-15
Payer: COMMERCIAL